# Patient Record
Sex: MALE | Race: BLACK OR AFRICAN AMERICAN | NOT HISPANIC OR LATINO | URBAN - METROPOLITAN AREA
[De-identification: names, ages, dates, MRNs, and addresses within clinical notes are randomized per-mention and may not be internally consistent; named-entity substitution may affect disease eponyms.]

---

## 2019-01-05 ENCOUNTER — INPATIENT (INPATIENT)
Facility: HOSPITAL | Age: 64
LOS: 1 days | Discharge: HOME | DRG: 66 | End: 2019-01-07
Attending: INTERNAL MEDICINE | Admitting: INTERNAL MEDICINE
Payer: COMMERCIAL

## 2019-01-05 VITALS
TEMPERATURE: 98 F | SYSTOLIC BLOOD PRESSURE: 150 MMHG | HEART RATE: 67 BPM | WEIGHT: 158.07 LBS | HEIGHT: 68 IN | OXYGEN SATURATION: 100 % | DIASTOLIC BLOOD PRESSURE: 90 MMHG | RESPIRATION RATE: 16 BRPM

## 2019-01-05 DIAGNOSIS — R27.0 ATAXIA, UNSPECIFIED: ICD-10-CM

## 2019-01-05 LAB
ALBUMIN SERPL ELPH-MCNC: 3.8 G/DL — SIGNIFICANT CHANGE UP (ref 3.5–5)
ALP SERPL-CCNC: 47 U/L — SIGNIFICANT CHANGE UP (ref 40–120)
ALT FLD-CCNC: 31 U/L DA — SIGNIFICANT CHANGE UP (ref 10–60)
ANION GAP SERPL CALC-SCNC: 6 MMOL/L — SIGNIFICANT CHANGE UP (ref 5–17)
APTT BLD: 30.8 SEC — SIGNIFICANT CHANGE UP (ref 27.5–36.3)
AST SERPL-CCNC: 21 U/L — SIGNIFICANT CHANGE UP (ref 10–40)
BASOPHILS # BLD AUTO: 0 K/UL — SIGNIFICANT CHANGE UP (ref 0–0.2)
BASOPHILS NFR BLD AUTO: 1.2 % — SIGNIFICANT CHANGE UP (ref 0–2)
BILIRUB SERPL-MCNC: 0.3 MG/DL — SIGNIFICANT CHANGE UP (ref 0.2–1.2)
BUN SERPL-MCNC: 15 MG/DL — SIGNIFICANT CHANGE UP (ref 7–18)
CALCIUM SERPL-MCNC: 8.8 MG/DL — SIGNIFICANT CHANGE UP (ref 8.4–10.5)
CHLORIDE SERPL-SCNC: 103 MMOL/L — SIGNIFICANT CHANGE UP (ref 96–108)
CK MB BLD-MCNC: <1.1 % — SIGNIFICANT CHANGE UP (ref 0–3.5)
CK MB CFR SERPL CALC: <1 NG/ML — SIGNIFICANT CHANGE UP (ref 0–3.6)
CK SERPL-CCNC: 87 U/L — SIGNIFICANT CHANGE UP (ref 35–232)
CO2 SERPL-SCNC: 30 MMOL/L — SIGNIFICANT CHANGE UP (ref 22–31)
CREAT SERPL-MCNC: 1.08 MG/DL — SIGNIFICANT CHANGE UP (ref 0.5–1.3)
EOSINOPHIL # BLD AUTO: 0 K/UL — SIGNIFICANT CHANGE UP (ref 0–0.5)
EOSINOPHIL NFR BLD AUTO: 0.7 % — SIGNIFICANT CHANGE UP (ref 0–6)
GLUCOSE SERPL-MCNC: 73 MG/DL — SIGNIFICANT CHANGE UP (ref 70–99)
HCT VFR BLD CALC: 44.4 % — SIGNIFICANT CHANGE UP (ref 39–50)
HGB BLD-MCNC: 14.2 G/DL — SIGNIFICANT CHANGE UP (ref 13–17)
INR BLD: 1.2 RATIO — HIGH (ref 0.88–1.16)
LYMPHOCYTES # BLD AUTO: 1.2 K/UL — SIGNIFICANT CHANGE UP (ref 1–3.3)
LYMPHOCYTES # BLD AUTO: 32.5 % — SIGNIFICANT CHANGE UP (ref 13–44)
MCHC RBC-ENTMCNC: 29.6 PG — SIGNIFICANT CHANGE UP (ref 27–34)
MCHC RBC-ENTMCNC: 32 GM/DL — SIGNIFICANT CHANGE UP (ref 32–36)
MCV RBC AUTO: 92.4 FL — SIGNIFICANT CHANGE UP (ref 80–100)
MONOCYTES # BLD AUTO: 0.4 K/UL — SIGNIFICANT CHANGE UP (ref 0–0.9)
MONOCYTES NFR BLD AUTO: 12.3 % — SIGNIFICANT CHANGE UP (ref 2–14)
NEUTROPHILS # BLD AUTO: 1.9 K/UL — SIGNIFICANT CHANGE UP (ref 1.8–7.4)
NEUTROPHILS NFR BLD AUTO: 53.2 % — SIGNIFICANT CHANGE UP (ref 43–77)
PLATELET # BLD AUTO: 210 K/UL — SIGNIFICANT CHANGE UP (ref 150–400)
POTASSIUM SERPL-MCNC: 3.7 MMOL/L — SIGNIFICANT CHANGE UP (ref 3.5–5.3)
POTASSIUM SERPL-SCNC: 3.7 MMOL/L — SIGNIFICANT CHANGE UP (ref 3.5–5.3)
PROT SERPL-MCNC: 8.6 G/DL — HIGH (ref 6–8.3)
PROTHROM AB SERPL-ACNC: 13.4 SEC — HIGH (ref 10–12.9)
RBC # BLD: 4.8 M/UL — SIGNIFICANT CHANGE UP (ref 4.2–5.8)
RBC # FLD: 11 % — SIGNIFICANT CHANGE UP (ref 10.3–14.5)
SODIUM SERPL-SCNC: 139 MMOL/L — SIGNIFICANT CHANGE UP (ref 135–145)
TROPONIN I SERPL-MCNC: <0.015 NG/ML — SIGNIFICANT CHANGE UP (ref 0–0.04)
WBC # BLD: 3.6 K/UL — LOW (ref 3.8–10.5)
WBC # FLD AUTO: 3.6 K/UL — LOW (ref 3.8–10.5)

## 2019-01-05 PROCEDURE — 99285 EMERGENCY DEPT VISIT HI MDM: CPT

## 2019-01-05 PROCEDURE — 70450 CT HEAD/BRAIN W/O DYE: CPT | Mod: 26

## 2019-01-05 RX ORDER — HYDROXYCHLOROQUINE SULFATE 200 MG
200 TABLET ORAL
Qty: 0 | Refills: 0 | Status: DISCONTINUED | OUTPATIENT
Start: 2019-01-05 | End: 2019-01-07

## 2019-01-05 RX ORDER — ATORVASTATIN CALCIUM 80 MG/1
40 TABLET, FILM COATED ORAL AT BEDTIME
Qty: 0 | Refills: 0 | Status: DISCONTINUED | OUTPATIENT
Start: 2019-01-05 | End: 2019-01-07

## 2019-01-05 RX ORDER — ASPIRIN/CALCIUM CARB/MAGNESIUM 324 MG
81 TABLET ORAL DAILY
Qty: 0 | Refills: 0 | Status: DISCONTINUED | OUTPATIENT
Start: 2019-01-05 | End: 2019-01-07

## 2019-01-05 NOTE — H&P ADULT - NEUROLOGICAL DETAILS
superficial reflexes intact/normal strength/alert and oriented x 3/sensation intact/cranial nerves intact

## 2019-01-05 NOTE — ED PROVIDER NOTE - OBJECTIVE STATEMENT
h/o Raynauds on Norvasc and Hydrochloroquine p/w slurred speech, dizziness, generalized weakness x 1 week. Pt feels difficulty articulating speech. Feels as if he will fall to one side when he walks. Wife reports pt weak, in bed for most of the day. No f/c/cough or body aches. No recent med changes.

## 2019-01-05 NOTE — H&P ADULT - PROBLEM SELECTOR PLAN 1
- p/w dizziness, slurring of speech and loss of balance  - CT head normal, CE normal  - no TPA as more than 4 hrs of symptoms  - will start on aspirin and statin  - on tele  - FU ECHO, carotid doppler  - Neuro consult  - Likely needs MRI after neuro consult - p/w dizziness, slurring of speech and loss of balance  - CT head normal, CE normal  - EKG NSR@64 bpm  - no TPA as more than 4 hrs of symptoms  - will start on aspirin and statin  - on tele  - FU ECHO, carotid doppler  - Neuro consult  - Likely needs MRI after neuro consult - p/w dizziness, slurring of speech and loss of balance, NIHSS O  - CT head normal, CE normal  - EKG NSR@64 bpm  - no TPA as more than 4 hrs of symptoms  - will start on aspirin and statin  - on tele  - FU ECHO, carotid doppler  - Neuro consult  - Likely needs MRI after neuro consult - p/w dizziness, slurring of speech and loss of balance, NIHSS O  - CT head normal, CE normal  - EKG NSR@64 bpm  - no TPA as more than 4 hrs of symptoms  - will start on aspirin and statin  - on tele  - FU ECHO, carotid doppler, MRI  - Neuro Dr. Sheth consult  - Cardio Dr. Hernandez  -

## 2019-01-05 NOTE — H&P ADULT - FAMILY HISTORY
Mother  Still living? Unknown  Family history of hypertension, Age at diagnosis: Age Unknown  Family history of diabetes mellitus, Age at diagnosis: Age Unknown  Family history of pancreatic disease, Age at diagnosis: Age Unknown     Father  Still living? Unknown  Family history of throat cancer, Age at diagnosis: Age Unknown

## 2019-01-05 NOTE — H&P ADULT - PROBLEM SELECTOR PLAN 3
IMPROVE VTE Individual Risk Assessment    RISK                                                                Points    [  ] Previous VTE                                                  3  [  ] Thrombophilia                                               2  [  ] Lower limb paralysis                                      2        (unable to hold up >15 seconds)    [  ] Current Cancer                                              2         (within 6 months)  [  ] Immobilization > 24 hrs                                1  [  ] ICU/CCU stay > 24 hours                              1  [ x ] Age > 60                                                      1    IMPROVE VTE Score 1  No indication for DVT PPx  No indication for GI PPx IMPROVE VTE Individual Risk Assessment    RISK                                                                Points    [  ] Previous VTE                                                  3  [  ] Thrombophilia                                               2  [  ] Lower limb paralysis                                      2        (unable to hold up >15 seconds)    [  ] Current Cancer                                              2         (within 6 months)  [  ] Immobilization > 24 hrs                                1  [  ] ICU/CCU stay > 24 hours                              1  [ x ] Age > 60                                                      1    IMPROVE VTE Score 1  will start on heparin as he will be mostly bed bound  No indication for GI PPx

## 2019-01-05 NOTE — H&P ADULT - ASSESSMENT
63M form home, with PMHx of raynaud's  presented to ED c/o dizziness and slurring of speech. He states he was in his usual state of health until monday, when he started feeling dizzy. He explained his dizziness as lightheadedness and denies room spinning sensation. Since thursday, he started noticing slurring of speech and difficulty articulating words, loss of balance and difficulty with coordination. He denies any weakness/N/V/tingling/ burning sensation/ visual symptoms.       ED course: Vitals stable  Radiological findings- CT head normal

## 2019-01-05 NOTE — H&P ADULT - HISTORY OF PRESENT ILLNESS
63M form home, with PMHx of raynaud's  presented to ED c/o dizziness and slurring of speech. He states he was in his usual state of health until monday, when he started feeling dizzy. He explained his dizziness as lightheadedness and denies room spinning sensation. Since thursday, he started noticing slurring of speech and difficulty articulating words, loss of balance and difficulty with coordination. He denies any weakness/N/V/tingling/ burning sensation/ visual symptoms.     On my encounter, he was speaking normally with no slurring however pt. said he is taking time to speak.  ED course: Vitals stable  Radiological findings- CT head normal 63M form home, with PMHx of raynaud's  presented to ED c/o dizziness and slurring of speech. He states he was in his usual state of health until monday, when he started feeling dizzy. He explained his dizziness as lightheadedness and denies room spinning sensation. Since thursday, he started noticing slurring of speech and difficulty articulating words, loss of balance and difficulty with coordination. He denies any weakness/N/V/tingling/ burning sensation/ visual symptoms.     On my encounter, he was speaking normally with no slurring however pt. said he is taking time to speak and not in his baseline. NIHSS 0  ED course: Vitals stable  Radiological findings- CT head normal

## 2019-01-06 DIAGNOSIS — I73.00 RAYNAUD'S SYNDROME WITHOUT GANGRENE: ICD-10-CM

## 2019-01-06 DIAGNOSIS — Z29.9 ENCOUNTER FOR PROPHYLACTIC MEASURES, UNSPECIFIED: ICD-10-CM

## 2019-01-06 DIAGNOSIS — I63.9 CEREBRAL INFARCTION, UNSPECIFIED: ICD-10-CM

## 2019-01-06 LAB
ANION GAP SERPL CALC-SCNC: 5 MMOL/L — SIGNIFICANT CHANGE UP (ref 5–17)
BASOPHILS # BLD AUTO: 0.1 K/UL — SIGNIFICANT CHANGE UP (ref 0–0.2)
BASOPHILS NFR BLD AUTO: 1.4 % — SIGNIFICANT CHANGE UP (ref 0–2)
BUN SERPL-MCNC: 12 MG/DL — SIGNIFICANT CHANGE UP (ref 7–18)
CALCIUM SERPL-MCNC: 8.6 MG/DL — SIGNIFICANT CHANGE UP (ref 8.4–10.5)
CHLORIDE SERPL-SCNC: 105 MMOL/L — SIGNIFICANT CHANGE UP (ref 96–108)
CHOLEST SERPL-MCNC: 209 MG/DL — HIGH (ref 10–199)
CK MB BLD-MCNC: <1.3 % — SIGNIFICANT CHANGE UP (ref 0–3.5)
CK MB BLD-MCNC: <1.4 % — SIGNIFICANT CHANGE UP (ref 0–3.5)
CK MB CFR SERPL CALC: <1 NG/ML — SIGNIFICANT CHANGE UP (ref 0–3.6)
CK MB CFR SERPL CALC: <1 NG/ML — SIGNIFICANT CHANGE UP (ref 0–3.6)
CK SERPL-CCNC: 73 U/L — SIGNIFICANT CHANGE UP (ref 35–232)
CK SERPL-CCNC: 76 U/L — SIGNIFICANT CHANGE UP (ref 35–232)
CO2 SERPL-SCNC: 28 MMOL/L — SIGNIFICANT CHANGE UP (ref 22–31)
CREAT SERPL-MCNC: 1.09 MG/DL — SIGNIFICANT CHANGE UP (ref 0.5–1.3)
EOSINOPHIL # BLD AUTO: 0 K/UL — SIGNIFICANT CHANGE UP (ref 0–0.5)
EOSINOPHIL NFR BLD AUTO: 1 % — SIGNIFICANT CHANGE UP (ref 0–6)
FOLATE SERPL-MCNC: 11.9 NG/ML — SIGNIFICANT CHANGE UP
GLUCOSE SERPL-MCNC: 82 MG/DL — SIGNIFICANT CHANGE UP (ref 70–99)
HBA1C BLD-MCNC: 5.6 % — SIGNIFICANT CHANGE UP (ref 4–5.6)
HCT VFR BLD CALC: 41.4 % — SIGNIFICANT CHANGE UP (ref 39–50)
HDLC SERPL-MCNC: 34 MG/DL — LOW
HGB BLD-MCNC: 13.4 G/DL — SIGNIFICANT CHANGE UP (ref 13–17)
LIPID PNL WITH DIRECT LDL SERPL: 137 MG/DL — SIGNIFICANT CHANGE UP
LYMPHOCYTES # BLD AUTO: 1.2 K/UL — SIGNIFICANT CHANGE UP (ref 1–3.3)
LYMPHOCYTES # BLD AUTO: 30.7 % — SIGNIFICANT CHANGE UP (ref 13–44)
MAGNESIUM SERPL-MCNC: 2.2 MG/DL — SIGNIFICANT CHANGE UP (ref 1.6–2.6)
MCHC RBC-ENTMCNC: 30.5 PG — SIGNIFICANT CHANGE UP (ref 27–34)
MCHC RBC-ENTMCNC: 32.2 GM/DL — SIGNIFICANT CHANGE UP (ref 32–36)
MCV RBC AUTO: 94.5 FL — SIGNIFICANT CHANGE UP (ref 80–100)
MONOCYTES # BLD AUTO: 0.4 K/UL — SIGNIFICANT CHANGE UP (ref 0–0.9)
MONOCYTES NFR BLD AUTO: 11.4 % — SIGNIFICANT CHANGE UP (ref 2–14)
NEUTROPHILS # BLD AUTO: 2.2 K/UL — SIGNIFICANT CHANGE UP (ref 1.8–7.4)
NEUTROPHILS NFR BLD AUTO: 55.5 % — SIGNIFICANT CHANGE UP (ref 43–77)
PHOSPHATE SERPL-MCNC: 3.4 MG/DL — SIGNIFICANT CHANGE UP (ref 2.5–4.5)
PLATELET # BLD AUTO: 202 K/UL — SIGNIFICANT CHANGE UP (ref 150–400)
POTASSIUM SERPL-MCNC: 3.5 MMOL/L — SIGNIFICANT CHANGE UP (ref 3.5–5.3)
POTASSIUM SERPL-SCNC: 3.5 MMOL/L — SIGNIFICANT CHANGE UP (ref 3.5–5.3)
RBC # BLD: 4.38 M/UL — SIGNIFICANT CHANGE UP (ref 4.2–5.8)
RBC # FLD: 11.1 % — SIGNIFICANT CHANGE UP (ref 10.3–14.5)
SODIUM SERPL-SCNC: 138 MMOL/L — SIGNIFICANT CHANGE UP (ref 135–145)
TOTAL CHOLESTEROL/HDL RATIO MEASUREMENT: 6.1 RATIO — SIGNIFICANT CHANGE UP (ref 3.4–9.6)
TRIGL SERPL-MCNC: 188 MG/DL — HIGH (ref 10–149)
TROPONIN I SERPL-MCNC: <0.015 NG/ML — SIGNIFICANT CHANGE UP (ref 0–0.04)
TROPONIN I SERPL-MCNC: <0.015 NG/ML — SIGNIFICANT CHANGE UP (ref 0–0.04)
TSH SERPL-MCNC: 2.44 UU/ML — SIGNIFICANT CHANGE UP (ref 0.34–4.82)
VIT B12 SERPL-MCNC: 267 PG/ML — SIGNIFICANT CHANGE UP (ref 232–1245)
WBC # BLD: 3.9 K/UL — SIGNIFICANT CHANGE UP (ref 3.8–10.5)
WBC # FLD AUTO: 3.9 K/UL — SIGNIFICANT CHANGE UP (ref 3.8–10.5)

## 2019-01-06 PROCEDURE — 99222 1ST HOSP IP/OBS MODERATE 55: CPT

## 2019-01-06 RX ORDER — HEPARIN SODIUM 5000 [USP'U]/ML
5000 INJECTION INTRAVENOUS; SUBCUTANEOUS EVERY 8 HOURS
Qty: 0 | Refills: 0 | Status: DISCONTINUED | OUTPATIENT
Start: 2019-01-06 | End: 2019-01-07

## 2019-01-06 RX ORDER — NIFEDIPINE 30 MG
60 TABLET, EXTENDED RELEASE 24 HR ORAL DAILY
Qty: 0 | Refills: 0 | Status: DISCONTINUED | OUTPATIENT
Start: 2019-01-06 | End: 2019-01-07

## 2019-01-06 RX ORDER — NIFEDIPINE 30 MG
1 TABLET, EXTENDED RELEASE 24 HR ORAL
Qty: 0 | Refills: 0 | COMMUNITY

## 2019-01-06 RX ADMIN — HEPARIN SODIUM 5000 UNIT(S): 5000 INJECTION INTRAVENOUS; SUBCUTANEOUS at 22:52

## 2019-01-06 RX ADMIN — Medication 2.5 MILLIGRAM(S): at 05:56

## 2019-01-06 RX ADMIN — ATORVASTATIN CALCIUM 40 MILLIGRAM(S): 80 TABLET, FILM COATED ORAL at 22:52

## 2019-01-06 RX ADMIN — Medication 81 MILLIGRAM(S): at 12:08

## 2019-01-06 RX ADMIN — Medication 200 MILLIGRAM(S): at 18:34

## 2019-01-06 RX ADMIN — Medication 60 MILLIGRAM(S): at 05:27

## 2019-01-06 RX ADMIN — HEPARIN SODIUM 5000 UNIT(S): 5000 INJECTION INTRAVENOUS; SUBCUTANEOUS at 14:22

## 2019-01-06 RX ADMIN — Medication 200 MILLIGRAM(S): at 05:56

## 2019-01-06 NOTE — CONSULT NOTE ADULT - SUBJECTIVE AND OBJECTIVE BOX
EP ATTENDING      HISTORY OF PRESENT ILLNESS: He is a pleasant 64 y/o male [facility] Raynauds who is admitted with dizziness and slurred speech. His initial NCHCT was unremarkable, and his EKG is currently sinus. No echo on file, neurology and brain MRI pending. He denies palpitations, syncope, nor angina.    PAST MEDICAL & SURGICAL HISTORY:  Raynaud disease  No significant past surgical history        MEDICATIONS  (STANDING):  aspirin  chewable 81 milliGRAM(s) Oral daily  atorvastatin 40 milliGRAM(s) Oral at bedtime  heparin  Injectable 5000 Unit(s) SubCutaneous every 8 hours  hydroxychloroquine 200 milliGRAM(s) Oral two times a day  NIFEdipine XL 60 milliGRAM(s) Oral daily  predniSONE   Tablet 2.5 milliGRAM(s) Oral daily      Allergies    No Known Allergies    Intolerances        FAMILY HISTORY:  Family history of pancreatic disease (Mother)  Family history of throat cancer (Father)  Family history of diabetes mellitus (Mother)  Family history of hypertension (Mother)    Non-contributary for premature coronary disease or sudden cardiac death    SOCIAL HISTORY:    [ x] Non-smoker  [ ] Smoker  [ ] Alcohol      REVIEW OF SYSTEMS:  [ ]chest pain  [  ]shortness of breath  [  ]palpitations  [  ]syncope  [ ]near syncope [ ]upper extremity weakness   [ ] lower extremity weakness  [  ]diplopia  [  ]altered mental status   [  ]fevers  [ ]chills [ ]nausea  [ ]vomitting  [  ]dysphagia    [ ]abdominal pain  [ ]melena  [ ]BRBPR    [  ]epistaxis  [  ]rash    [ ]lower extremity edema        [x ] All others negative	  [ ] Unable to obtain    PHYSICAL EXAM:  T(C): 36.8 (01-06-19 @ 10:37), Max: 37 (01-06-19 @ 07:28)  HR: 74 (01-06-19 @ 10:37) (58 - 74)  BP: 143/79 (01-06-19 @ 10:37) (114/59 - 150/90)  RR: 16 (01-06-19 @ 10:37) (12 - 17)  SpO2: 97% (01-06-19 @ 10:37) (97% - 100%)  Wt(kg): --    Appearance: Normal	  HEENT:   Normal oral mucosa, PERRL, EOMI	  Lymphatic: No lymphadenopathy , no edema  Cardiovascular: Normal S1 S2, No JVD, No murmurs , Peripheral pulses palpable 2+ bilaterally  Respiratory: Lungs clear to auscultation, normal effort 	  Gastrointestinal:  Soft, Non-tender, + BS	  Skin: No rashes, No ecchymoses, No cyanosis, warm to touch  Musculoskeletal: Normal range of motion, normal strength  Psychiatry:  Mood & affect appropriate      TELEMETRY: 	    ECG:  	    Echo:  NST:  Cath:  	  	  LABS:	 	                          13.4   3.9   )-----------( 202      ( 06 Jan 2019 06:20 )             41.4     01-06    138  |  105  |  12  ----------------------------<  82  3.5   |  28  |  1.09    Ca    8.6      06 Jan 2019 06:20  Phos  3.4     01-06  Mg     2.2     01-06    TPro  8.6<H>  /  Alb  3.8  /  TBili  0.3  /  DBili  x   /  AST  21  /  ALT  31  /  AlkPhos  47  01-05    proBNP:   Lipid Profile:   HgA1c: Hemoglobin A1C, Whole Blood: 5.6 % (01-06 @ 10:50)    TSH: Thyroid Stimulating Hormone, Serum: 2.44 uU/mL (01-06 @ 06:20)      A/P) He is a pleasant 64 y/[facility] male [facility] with dizziness and slurred speech. His initial NCHCT was unremarkable, and his EKG is currently sinus. No echo on file, neurology and brain MRI pending. He denies palpitations, syncope, nor angina.    -get brain MRI to evaluate for stroke  -consider neurology consult  -get echo and admit to telemetry  -if his workup is consistent with a cryptogenic stroke then I would recommend an outpatient ILR specifically to rule out atrial fibrillation  -Dr Hernandez to assume cardiac care tomorrow      Cyndi Rosen M.D., RS  Cardiac Electrophysiology  [facility]  [address] [place] [address], [address] [place] [address]  [email or web address]    office [phone]  pager [phone]

## 2019-01-06 NOTE — PROGRESS NOTE ADULT - SUBJECTIVE AND OBJECTIVE BOX
Patient is a 63y old  Male who presents with a chief complaint of dizziness and slurring of speech (05 Jan 2019 21:36)    PATIENT IS SEEN AND EXAMINED IN MEDICAL FLOOR.    ALLERGIES:  No Known Allergies    Daily Height in cm: 172.72 (05 Jan 2019 14:17)      VITALS:    Vital Signs Last 24 Hrs  T(C): 37 (06 Jan 2019 07:28), Max: 37 (06 Jan 2019 07:28)  T(F): 98.6 (06 Jan 2019 07:28), Max: 98.6 (06 Jan 2019 07:28)  HR: 58 (06 Jan 2019 07:28) (58 - 68)  BP: 132/81 (06 Jan 2019 07:28) (114/59 - 150/90)  BP(mean): --  RR: 16 (06 Jan 2019 07:28) (12 - 17)  SpO2: 97% (06 Jan 2019 07:28) (97% - 100%)    LABS:    CBC Full  -  ( 06 Jan 2019 06:20 )  WBC Count : 3.9 K/uL  Hemoglobin : 13.4 g/dL  Hematocrit : 41.4 %  Platelet Count - Automated : 202 K/uL  Mean Cell Volume : 94.5 fl  Mean Cell Hemoglobin : 30.5 pg  Mean Cell Hemoglobin Concentration : 32.2 gm/dL  Auto Neutrophil # : 2.2 K/uL  Auto Lymphocyte # : 1.2 K/uL  Auto Monocyte # : 0.4 K/uL  Auto Eosinophil # : 0.0 K/uL  Auto Basophil # : 0.1 K/uL  Auto Neutrophil % : 55.5 %  Auto Lymphocyte % : 30.7 %  Auto Monocyte % : 11.4 %  Auto Eosinophil % : 1.0 %  Auto Basophil % : 1.4 %    PT/INR - ( 05 Jan 2019 16:23 )   PT: 13.4 sec;   INR: 1.20 ratio         PTT - ( 05 Jan 2019 16:23 )  PTT:30.8 sec  01-06    138  |  105  |  12  ----------------------------<  82  3.5   |  28  |  1.09    Ca    8.6      06 Jan 2019 06:20  Phos  3.4     01-06  Mg     2.2     01-06    TPro  8.6<H>  /  Alb  3.8  /  TBili  0.3  /  DBili  x   /  AST  21  /  ALT  31  /  AlkPhos  47  01-05      CARDIAC MARKERS ( 06 Jan 2019 06:20 )  <0.015 ng/mL / x     / 73 U/L / x     / <1.0 ng/mL  CARDIAC MARKERS ( 06 Jan 2019 04:44 )  <0.015 ng/mL / x     / 76 U/L / x     / <1.0 ng/mL  CARDIAC MARKERS ( 05 Jan 2019 16:23 )  <0.015 ng/mL / x     / 87 U/L / x     / <1.0 ng/mL      LIVER FUNCTIONS - ( 05 Jan 2019 16:23 )  Alb: 3.8 g/dL / Pro: 8.6 g/dL / ALK PHOS: 47 U/L / ALT: 31 U/L DA / AST: 21 U/L / GGT: x           Creatinine Trend: 1.09<--, 1.08<--  I&O's Summary      MEDICATIONS:    MEDICATIONS  (STANDING):  aspirin  chewable 81 milliGRAM(s) Oral daily  atorvastatin 40 milliGRAM(s) Oral at bedtime  heparin  Injectable 5000 Unit(s) SubCutaneous every 8 hours  hydroxychloroquine 200 milliGRAM(s) Oral two times a day  NIFEdipine XL 60 milliGRAM(s) Oral daily  predniSONE   Tablet 2.5 milliGRAM(s) Oral daily      MEDICATIONS  (PRN):      REVIEW OF SYSTEMS:                           ALL ROS DONE [ X   ]    CONSTITUTIONAL:  LETHARGIC [   ], FEVER [   ], UNRESPONSIVE [   ]  CVS:  CP  [   ], SOB, [   ], PALPITATIONS [   ], DIZZYNESS [   ]  RS: COUGH [   ], SPUTUM [   ]  GI: ABDOMINAL PAIN [   ], NAUSEA [   ], VOMITINGS [   ], DIARRHEA [   ], CONSTIPATION [   ]  :  DYSURIA [   ], NOCTURIA [   ], INCREASED FREQUENCY [   ], DRIBLING [   ],  SKELETAL: PAINFUL JOINTS [   ], SWOLLEN JOINTS [   ], NECK ACHE [   ], LOW BACK ACHE [   ],  SKIN : ULCERS [   ], RASH [   ], ITCHING [   ]  CNS: HEAD ACHE [   ], DOUBLE VISION [   ], BLURRED VISION [   ], AMS / CONFUSION [   ], SEIZURES [   ], WEAKNESS [   ],TINGLING / NUMBNESS [   ]    PHYSICAL EXAMINATION:  GENERAL APPEARANCE: NO DISTRESS  HEENT:  NO PALLOR, NO  JVD,  NO   NODES, NECK SUPPLE  CVS: S1 +, S2 +,   RS: AEEB,  OCCASIONAL  RALES +,   NO RONCHI  ABD: SOFT, NT, NO, BS +  EXT: NO PE  SKIN: WARM,   SKELETAL:  ROM ACCEPTABLE  CNS:  AAO X    ,   DEFICITS    RADIOLOGY :      ASSESSMENT :     Ataxia  Raynaud disease        PLAN:  HPI:  63M form home, with PMHx of raynaud's  presented to ED c/o dizziness and slurring of speech. He states he was in his usual state of health until monday, when he started feeling dizzy. He explained his dizziness as lightheadedness and denies room spinning sensation. Since thursday, he started noticing slurring of speech and difficulty articulating words, loss of balance and difficulty with coordination. He denies any weakness/N/V/tingling/ burning sensation/ visual symptoms.     On my encounter, he was speaking normally with no slurring however pt. said he is taking time to speak and not in his baseline. NIHSS 0  ED course: Vitals stable  Radiological findings- CT head normal (05 Jan 2019 21:36)    - Patient is a 63y old  Male who presents with a chief complaint of dizziness and slurring of speech (05 Jan 2019 21:36)    PATIENT IS SEEN AND EXAMINED IN MEDICAL FLOOR.    ALLERGIES:  No Known Allergies    Daily Height in cm: 172.72 (05 Jan 2019 14:17)      VITALS:    Vital Signs Last 24 Hrs  T(C): 37 (06 Jan 2019 07:28), Max: 37 (06 Jan 2019 07:28)  T(F): 98.6 (06 Jan 2019 07:28), Max: 98.6 (06 Jan 2019 07:28)  HR: 58 (06 Jan 2019 07:28) (58 - 68)  BP: 132/81 (06 Jan 2019 07:28) (114/59 - 150/90)  BP(mean): --  RR: 16 (06 Jan 2019 07:28) (12 - 17)  SpO2: 97% (06 Jan 2019 07:28) (97% - 100%)    LABS:    CBC Full  -  ( 06 Jan 2019 06:20 )  WBC Count : 3.9 K/uL  Hemoglobin : 13.4 g/dL  Hematocrit : 41.4 %  Platelet Count - Automated : 202 K/uL  Mean Cell Volume : 94.5 fl  Mean Cell Hemoglobin : 30.5 pg  Mean Cell Hemoglobin Concentration : 32.2 gm/dL  Auto Neutrophil # : 2.2 K/uL  Auto Lymphocyte # : 1.2 K/uL  Auto Monocyte # : 0.4 K/uL  Auto Eosinophil # : 0.0 K/uL  Auto Basophil # : 0.1 K/uL  Auto Neutrophil % : 55.5 %  Auto Lymphocyte % : 30.7 %  Auto Monocyte % : 11.4 %  Auto Eosinophil % : 1.0 %  Auto Basophil % : 1.4 %    PT/INR - ( 05 Jan 2019 16:23 )   PT: 13.4 sec;   INR: 1.20 ratio         PTT - ( 05 Jan 2019 16:23 )  PTT:30.8 sec  01-06    138  |  105  |  12  ----------------------------<  82  3.5   |  28  |  1.09    Ca    8.6      06 Jan 2019 06:20  Phos  3.4     01-06  Mg     2.2     01-06    TPro  8.6<H>  /  Alb  3.8  /  TBili  0.3  /  DBili  x   /  AST  21  /  ALT  31  /  AlkPhos  47  01-05      CARDIAC MARKERS ( 06 Jan 2019 06:20 )  <0.015 ng/mL / x     / 73 U/L / x     / <1.0 ng/mL  CARDIAC MARKERS ( 06 Jan 2019 04:44 )  <0.015 ng/mL / x     / 76 U/L / x     / <1.0 ng/mL  CARDIAC MARKERS ( 05 Jan 2019 16:23 )  <0.015 ng/mL / x     / 87 U/L / x     / <1.0 ng/mL      LIVER FUNCTIONS - ( 05 Jan 2019 16:23 )  Alb: 3.8 g/dL / Pro: 8.6 g/dL / ALK PHOS: 47 U/L / ALT: 31 U/L DA / AST: 21 U/L / GGT: x           Creatinine Trend: 1.09<--, 1.08<--  I&O's Summary      MEDICATIONS:    MEDICATIONS  (STANDING):  aspirin  chewable 81 milliGRAM(s) Oral daily  atorvastatin 40 milliGRAM(s) Oral at bedtime  heparin  Injectable 5000 Unit(s) SubCutaneous every 8 hours  hydroxychloroquine 200 milliGRAM(s) Oral two times a day  NIFEdipine XL 60 milliGRAM(s) Oral daily  predniSONE   Tablet 2.5 milliGRAM(s) Oral daily      MEDICATIONS  (PRN):      REVIEW OF SYSTEMS:                           ALL ROS DONE [ X   ]    CONSTITUTIONAL:  LETHARGIC [   ], FEVER [   ], UNRESPONSIVE [   ]  CVS:  CP  [   ], SOB, [   ], PALPITATIONS [   ], DIZZYNESS [   ]  RS: COUGH [   ], SPUTUM [   ]  GI: ABDOMINAL PAIN [   ], NAUSEA [   ], VOMITINGS [   ], DIARRHEA [   ], CONSTIPATION [   ]  :  DYSURIA [   ], NOCTURIA [   ], INCREASED FREQUENCY [   ], DRIBLING [   ],  SKELETAL: PAINFUL JOINTS [   ], SWOLLEN JOINTS [   ], NECK ACHE [   ], LOW BACK ACHE [   ],  SKIN : ULCERS [   ], RASH [   ], ITCHING [   ]  CNS: HEAD ACHE [   ], DOUBLE VISION [   ], BLURRED VISION [   ], AMS / CONFUSION [   ], SEIZURES [   ], WEAKNESS [   ],TINGLING / NUMBNESS [   ]    PHYSICAL EXAMINATION:  GENERAL APPEARANCE: NO DISTRESS  HEENT:  NO PALLOR, NO  JVD,  NO   NODES, NECK SUPPLE  CVS: S1 +, S2 +,   RS: AEEB,  OCCASIONAL  RALES +,   NO RONCHI  ABD: SOFT, NT, NO, BS +  EXT: NO PE  SKIN: WARM,   SKELETAL:  ROM ACCEPTABLE  CNS:  AAO X 3   ,  NO DEFICITS    RADIOLOGY :    < from: CT Head No Cont (01.05.19 @ 16:51) >    IMPRESSION:  No acute intracranial hemorrhage or acute territorial infarct. If acute   ischemia is a strong clinical concern, MRI exam is recommended for   further evaluation if there is no contraindication.    < end of copied text >      ASSESSMENT :     Ataxia  Raynaud disease        PLAN:  HPI:  63M form home, with PMHx of raynaud's  presented to ED c/o dizziness and slurring of speech. He states he was in his usual state of health until monday, when he started feeling dizzy. He explained his dizziness as lightheadedness and denies room spinning sensation. Since thursday, he started noticing slurring of speech and difficulty articulating words, loss of balance and difficulty with coordination. He denies any weakness/N/V/tingling/ burning sensation/ visual symptoms.     On my encounter, he was speaking normally with no slurring however pt. said he is taking time to speak and not in his baseline. NIHSS 0  ED course: Vitals stable  Radiological findings- CT head normal (05 Jan 2019 21:36)    - SUSPECT CVA / VASCULITIS - OBTAIN MRI OF BRAIN AND NEUROLOGY CONSULT  - F/UP ECHOCARDIOGRAM  - DC PLAN IN AM HOME , IF STABLE  - GI AND DVT PROPHYLAXIS  - DR. MANTILLA

## 2019-01-06 NOTE — CONSULT NOTE ADULT - ASSESSMENT
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  A/P:    - No IV tpa given because he arrived in the ED >24 hours after onset of symptoms  - ASA/ PLavix  - Statin  - Dysphagia screen  - DVT prophylaxia  - MRI/A brain-carotids  - PT eval  - Speech/swallow eval  2) Esotropia of recent onset without cause and recent fatigue, if not explained by location of an old lacunar stroke, consider MG; plan check antibody to Acetylcholine Receptor and arrange for follow up in the outpatient office after discharge.  Total Critical Care Time spent:

## 2019-01-06 NOTE — CONSULT NOTE ADULT - SUBJECTIVE AND OBJECTIVE BOX
Patient is a 63y old  Male who presents with a chief complaint of dizziness and slurring of speech (06 Jan 2019 13:40)      HPI:  Well until Wednesday. After a short nap he noted he had difficulty speaking. He had difficulty enunciating words that werecoming out of his mouth slurred. His wife also noted a few words that did not sound like a word in English.  His wife also noted staggering when walking. He is a  and he noted fatigue and difficulty placing the mail into the correct boxes.    PAST MEDICAL & SURGICAL HISTORY:  Raynaud disease  No significant past surgical history      FAMILY HISTORY:  Family history of pancreatic disease (Mother)  Family history of throat cancer (Father)  Family history of diabetes mellitus (Mother)  Family history of hypertension (Mother)        Social Hx:  Nonsmoker, no drug or alcohol use    MEDICATIONS  (STANDING):  aspirin  chewable 81 milliGRAM(s) Oral daily  atorvastatin 40 milliGRAM(s) Oral at bedtime  heparin  Injectable 5000 Unit(s) SubCutaneous every 8 hours  hydroxychloroquine 200 milliGRAM(s) Oral two times a day  NIFEdipine XL 60 milliGRAM(s) Oral daily  predniSONE   Tablet 2.5 milliGRAM(s) Oral daily       Allergies    No Known Allergies    Intolerances        ROS: Pertinent positives in HPI, all other ROS were reviewed and are negative.      Vital Signs Last 24 Hrs  T(C): 36.8 (06 Jan 2019 10:37), Max: 37 (06 Jan 2019 07:28)  T(F): 98.3 (06 Jan 2019 10:37), Max: 98.6 (06 Jan 2019 07:28)  HR: 74 (06 Jan 2019 10:37) (58 - 74)  BP: 143/79 (06 Jan 2019 10:37) (114/59 - 143/79)  BP(mean): --  RR: 16 (06 Jan 2019 10:37) (12 - 16)  SpO2: 97% (06 Jan 2019 10:37) (97% - 100%)        Constitutional: awake and alert.  HEENT: PERRLA, EOMI,   Neck: Supple.  Respiratory: Breath sounds are clear bilaterally  Cardiovascular: S1 and S2, regular / irregular rhythm  Gastrointestinal: soft, nontender  Extremities:  no edema  Vascular: Caritid Bruit - no  Musculoskeletal: no joint swelling/tenderness, no abnormal movements  Skin: No rashes    Neurological exam:  HF: A x O x 3. Appropriately interactive, normal affect. Speech dysarthri, No Aphasia or paraphasic errors. Naming /repetition intact but he named the collar "neck" and called the sleeve of the coat the"wrist"  CN: DON, EOM: Esotropia that he can correct when focussing with one eye at a time OD>OD. VFF, facial sensation normal, left NLFD, tongue midline, Palate moves equally, SCM equal bilaterally  Motor: No pronator drift, Strength 5/5 in all 4 ext, normal bulk and tone, no tremor, rigidity or bradykinesia.    Sens: Intact to light touch / PP/ VS/ JS   Extinction on the left  Reflexes: Symmetric and normal . BJ 2+, BR 2+, KJ 2+, AJ 2+, downgoing toes b/l  Coord:  Left FNFA, dysmetria, NIEVES intact   Gait/Balance: Normal; unable to walk tandem; Romberg +    NIHSS: 6  1A: Level of consciousness       0= Alert; keenly responsive  1B: Ask month and age       0= Both questions right  1C: "Blink eyes" and "Squeeze Hands"       0= Performs both  2: Horizontal EOMs       +1= Partial gaze palsy: can be overcome  3: Visual fields       0= No visual loss  4: Facial palsy (use grimace if obtunded)       0= Normal symmetry       +1= Minor paralysis (flat NLF, smile asymmetry)   5A: Left arm motor drift (count out loud and use fingers to show count)       0= No drift x 10 seconds       +1= Drift but doesn't hit bed  5B: Right arm motor drift       0= No drift x 10 seconds  6A: Left leg motor drift       0= No drift x 10 seconds  6B: Right leg motor drift       0= No drift x 10 seconds  7: Limb ataxia (FNF/heel-shin)      +1= Ataxia in 1 limb  8: Sensation       0= Normal, no sensory loss  9: Language/aphasia- describe the scene (on jermaine); name the items; read the sentences (on jermaine)       0= Normal, no aphasia  10: Dysarthria- read the words       +1= mild-moderate dysarthria: slurring but can be understood       11: Extinction/inattention       0= No abnormality       +1= visual/tactile/auditory/spatial/personal inattention         MRS 3    Labs:                        13.4   3.9   )-----------( 202      ( 06 Jan 2019 06:20 )             41.4     01-06    138  |  105  |  12  ----------------------------<  82  3.5   |  28  |  1.09    Ca    8.6      06 Jan 2019 06:20  Phos  3.4     01-06  Mg     2.2     01-06    TPro  8.6<H>  /  Alb  3.8  /  TBili  0.3  /  DBili  x   /  AST  21  /  ALT  31  /  AlkPhos  47  01-05 01-06 [id number] 5.6    01-06 Chol 209<H>  HDL 34<L> Trig 188<H>  PT/INR - ( 05 Jan 2019 16:23 )   PT: 13.4 sec;   INR: 1.20 ratio         PTT - ( 05 Jan 2019 16:23 )  PTT:30.8 sec    Radiology report:  - CT head:    < from: CT Head No Cont (01.05.19 @ 16:51) >  EXAM:  CT BRAIN                            PROCEDURE DATE:  01/05/2019          INTERPRETATION:  CLINICAL STATEMENT: Dysarthria    TECHNIQUE: CT of the head was performed without IV contrast.    COMPARISON: None.    FINDINGS:  There is no acute intracranial hemorrhage, parenchymal mass, mass effect   or midline shift. There is no acute territorial infarct. There is no   hydrocephalus. Nonspecific bowing of the septum pellucidum to the left.   Chronic deformity right globe    The cranium is intact.     The visualized paranasal sinuses are   well-aerated.    IMPRESSION:  No acute intracranial hemorrhage or acute territorial infarct. If acute   ischemia is a strong clinical concern, MRI exam is recommended for   further evaluation if there is no contraindication.      DIANA STEWART M.D., ATTENDING RADIOLOGIST    < end of copied text >

## 2019-01-07 ENCOUNTER — TRANSCRIPTION ENCOUNTER (OUTPATIENT)
Age: 64
End: 2019-01-07

## 2019-01-07 VITALS
TEMPERATURE: 98 F | RESPIRATION RATE: 17 BRPM | SYSTOLIC BLOOD PRESSURE: 131 MMHG | HEART RATE: 71 BPM | DIASTOLIC BLOOD PRESSURE: 83 MMHG | OXYGEN SATURATION: 95 %

## 2019-01-07 LAB
ANION GAP SERPL CALC-SCNC: 7 MMOL/L — SIGNIFICANT CHANGE UP (ref 5–17)
BUN SERPL-MCNC: 11 MG/DL — SIGNIFICANT CHANGE UP (ref 7–18)
CALCIUM SERPL-MCNC: 8.9 MG/DL — SIGNIFICANT CHANGE UP (ref 8.4–10.5)
CHLORIDE SERPL-SCNC: 101 MMOL/L — SIGNIFICANT CHANGE UP (ref 96–108)
CO2 SERPL-SCNC: 28 MMOL/L — SIGNIFICANT CHANGE UP (ref 22–31)
CREAT SERPL-MCNC: 1.07 MG/DL — SIGNIFICANT CHANGE UP (ref 0.5–1.3)
GLUCOSE SERPL-MCNC: 92 MG/DL — SIGNIFICANT CHANGE UP (ref 70–99)
HCT VFR BLD CALC: 43.4 % — SIGNIFICANT CHANGE UP (ref 39–50)
HGB BLD-MCNC: 14.2 G/DL — SIGNIFICANT CHANGE UP (ref 13–17)
MCHC RBC-ENTMCNC: 30.5 PG — SIGNIFICANT CHANGE UP (ref 27–34)
MCHC RBC-ENTMCNC: 32.8 GM/DL — SIGNIFICANT CHANGE UP (ref 32–36)
MCV RBC AUTO: 92.8 FL — SIGNIFICANT CHANGE UP (ref 80–100)
PLATELET # BLD AUTO: 206 K/UL — SIGNIFICANT CHANGE UP (ref 150–400)
POTASSIUM SERPL-MCNC: 3.5 MMOL/L — SIGNIFICANT CHANGE UP (ref 3.5–5.3)
POTASSIUM SERPL-SCNC: 3.5 MMOL/L — SIGNIFICANT CHANGE UP (ref 3.5–5.3)
RBC # BLD: 4.68 M/UL — SIGNIFICANT CHANGE UP (ref 4.2–5.8)
RBC # FLD: 11 % — SIGNIFICANT CHANGE UP (ref 10.3–14.5)
SODIUM SERPL-SCNC: 136 MMOL/L — SIGNIFICANT CHANGE UP (ref 135–145)
WBC # BLD: 4.1 K/UL — SIGNIFICANT CHANGE UP (ref 3.8–10.5)
WBC # FLD AUTO: 4.1 K/UL — SIGNIFICANT CHANGE UP (ref 3.8–10.5)

## 2019-01-07 PROCEDURE — 97161 PT EVAL LOW COMPLEX 20 MIN: CPT

## 2019-01-07 PROCEDURE — 93005 ELECTROCARDIOGRAM TRACING: CPT

## 2019-01-07 PROCEDURE — 93880 EXTRACRANIAL BILAT STUDY: CPT

## 2019-01-07 PROCEDURE — 93306 TTE W/DOPPLER COMPLETE: CPT

## 2019-01-07 PROCEDURE — 82607 VITAMIN B-12: CPT

## 2019-01-07 PROCEDURE — 86041 ACETYLCHOLN RCPTR BNDNG ANTB: CPT

## 2019-01-07 PROCEDURE — 85027 COMPLETE CBC AUTOMATED: CPT

## 2019-01-07 PROCEDURE — 80048 BASIC METABOLIC PNL TOTAL CA: CPT

## 2019-01-07 PROCEDURE — 80061 LIPID PANEL: CPT

## 2019-01-07 PROCEDURE — 99285 EMERGENCY DEPT VISIT HI MDM: CPT | Mod: 25

## 2019-01-07 PROCEDURE — 83036 HEMOGLOBIN GLYCOSYLATED A1C: CPT

## 2019-01-07 PROCEDURE — 84484 ASSAY OF TROPONIN QUANT: CPT

## 2019-01-07 PROCEDURE — 96372 THER/PROPH/DIAG INJ SC/IM: CPT

## 2019-01-07 PROCEDURE — 70450 CT HEAD/BRAIN W/O DYE: CPT

## 2019-01-07 PROCEDURE — 93880 EXTRACRANIAL BILAT STUDY: CPT | Mod: 26

## 2019-01-07 PROCEDURE — 83735 ASSAY OF MAGNESIUM: CPT

## 2019-01-07 PROCEDURE — 70544 MR ANGIOGRAPHY HEAD W/O DYE: CPT

## 2019-01-07 PROCEDURE — 82550 ASSAY OF CK (CPK): CPT

## 2019-01-07 PROCEDURE — 70551 MRI BRAIN STEM W/O DYE: CPT

## 2019-01-07 PROCEDURE — 84443 ASSAY THYROID STIM HORMONE: CPT

## 2019-01-07 PROCEDURE — 99233 SBSQ HOSP IP/OBS HIGH 50: CPT

## 2019-01-07 PROCEDURE — 80053 COMPREHEN METABOLIC PANEL: CPT

## 2019-01-07 PROCEDURE — 36415 COLL VENOUS BLD VENIPUNCTURE: CPT

## 2019-01-07 PROCEDURE — 82746 ASSAY OF FOLIC ACID SERUM: CPT

## 2019-01-07 PROCEDURE — 85610 PROTHROMBIN TIME: CPT

## 2019-01-07 PROCEDURE — 85730 THROMBOPLASTIN TIME PARTIAL: CPT

## 2019-01-07 PROCEDURE — 82553 CREATINE MB FRACTION: CPT

## 2019-01-07 PROCEDURE — 84100 ASSAY OF PHOSPHORUS: CPT

## 2019-01-07 PROCEDURE — 70551 MRI BRAIN STEM W/O DYE: CPT | Mod: 26

## 2019-01-07 RX ORDER — ATORVASTATIN CALCIUM 80 MG/1
1 TABLET, FILM COATED ORAL
Qty: 30 | Refills: 0 | OUTPATIENT
Start: 2019-01-07 | End: 2019-02-05

## 2019-01-07 RX ORDER — ASPIRIN/CALCIUM CARB/MAGNESIUM 324 MG
1 TABLET ORAL
Qty: 30 | Refills: 0 | OUTPATIENT
Start: 2019-01-07 | End: 2019-02-05

## 2019-01-07 RX ADMIN — HEPARIN SODIUM 5000 UNIT(S): 5000 INJECTION INTRAVENOUS; SUBCUTANEOUS at 13:21

## 2019-01-07 RX ADMIN — Medication 81 MILLIGRAM(S): at 12:07

## 2019-01-07 RX ADMIN — Medication 2.5 MILLIGRAM(S): at 06:23

## 2019-01-07 RX ADMIN — HEPARIN SODIUM 5000 UNIT(S): 5000 INJECTION INTRAVENOUS; SUBCUTANEOUS at 05:45

## 2019-01-07 RX ADMIN — Medication 200 MILLIGRAM(S): at 05:44

## 2019-01-07 RX ADMIN — Medication 60 MILLIGRAM(S): at 05:44

## 2019-01-07 NOTE — CONSULT NOTE ADULT - SUBJECTIVE AND OBJECTIVE BOX
HISTORY OF PRESENT ILLNESS: HPI:  63M form home, with PMHx of raynaud's  presented to ED c/o dizziness and slurring of speech. He states he was in his usual state of health until monday, when he started feeling dizzy. He explained his dizziness as lightheadedness and denies room spinning sensation. Since thursday, he started noticing slurring of speech and difficulty articulating words, loss of balance and difficulty with coordination. He denies any weakness/N/V/tingling/ burning sensation/ visual symptoms.     On my encounter, he was speaking normally with no slurring however pt. said he is taking time to speak and not in his baseline. NIHSS 0  ED course: Vitals stable  Radiological findings- CT head normal (05 Jan 2019 21:36)      PAST MEDICAL & SURGICAL HISTORY:  Raynaud disease  No significant past surgical history          MEDICATIONS:  MEDICATIONS  (STANDING):  aspirin  chewable 81 milliGRAM(s) Oral daily  atorvastatin 40 milliGRAM(s) Oral at bedtime  heparin  Injectable 5000 Unit(s) SubCutaneous every 8 hours  hydroxychloroquine 200 milliGRAM(s) Oral two times a day  NIFEdipine XL 60 milliGRAM(s) Oral daily  predniSONE   Tablet 2.5 milliGRAM(s) Oral daily      Allergies    No Known Allergies    Intolerances        FAMILY HISTORY:  Family history of pancreatic disease (Mother)  Family history of throat cancer (Father)  Family history of diabetes mellitus (Mother)  Family history of hypertension (Mother)    Non-contributary for premature coronary disease or sudden cardiac death    SOCIAL HISTORY:    [X ] Non-smoker  [ ] Smoker  [ ] Alcohol      REVIEW OF SYSTEMS:  [ ]chest pain  [  ]shortness of breath  [  ]palpitations  [  ]syncope  [ ]near syncope [ ]upper extremity weakness   [ ] lower extremity weakness  [  ]diplopia  [  ]altered mental status   [  ]fevers  [ ]chills [ ]nausea  [ ]vomitting  [  ]dysphagia    [ ]abdominal pain  [ ]melena  [ ]BRBPR    [  ]epistaxis  [  ]rash    [ ]lower extremity edema        [X ] All others negative	  [ ] Unable to obtain      LABS:	 	    CARDIAC MARKERS:  CARDIAC MARKERS ( 06 Jan 2019 06:20 )  <0.015 ng/mL / x     / 73 U/L / x     / <1.0 ng/mL  CARDIAC MARKERS ( 06 Jan 2019 04:44 )  <0.015 ng/mL / x     / 76 U/L / x     / <1.0 ng/mL  CARDIAC MARKERS ( 05 Jan 2019 16:23 )  <0.015 ng/mL / x     / 87 U/L / x     / <1.0 ng/mL                              14.2   4.1   )-----------( 206      ( 07 Jan 2019 05:27 )             43.4     Hb Trend: 14.2<--    01-07    136  |  101  |  11  ----------------------------<  92  3.5   |  28  |  1.07    Ca    8.9      07 Jan 2019 05:27  Phos  3.4     01-06  Mg     2.2     01-06    TPro  8.6<H>  /  Alb  3.8  /  TBili  0.3  /  DBili  x   /  AST  21  /  ALT  31  /  AlkPhos  47  01-05    Creatinine Trend: 1.07<--, 1.09<--, 1.08<--      HgA1c: Hemoglobin A1C, Whole Blood: 5.6 % (01-06 @ 10:50)      PHYSICAL EXAM:  T(C): 36.7 (01-07-19 @ 08:20), Max: 37.2 (01-06-19 @ 15:46)  HR: 68 (01-07-19 @ 08:20) (61 - 77)  BP: 127/80 (01-07-19 @ 08:20) (124/74 - 143/79)  RR: 18 (01-07-19 @ 08:20) (16 - 18)  SpO2: 100% (01-07-19 @ 08:20) (97% - 100%)  Wt(kg): --  I&O's Summary      Gen: Appears well in NAD  HEENT:  (-)icterus (-)pallor  CV: N S1 S2 1/6 CHE (+)2 Pulses B/l  Resp:  Clear to ausculatation B/L, normal effort  GI: (+) BS Soft, NT, ND  Lymph:  (-)Edema, (-)obvious lymphadenopathy  Skin: Warm to touch, Normal turgor  Psych: Appropriate mood and affect        TELEMETRY: 	  Sinus    ECG:  	Sinus Early repolarization     RADIOLOGY:         CXR:   PENDING    ASSESSMENT/PLAN: 	63y Male PMHx of raynaud's  presented to ED c/o dizziness and slurring of speech. concern for CVA    - no pertinent findings on tel or echo  - His EKG is more consistent with early repolarization than injury or pericarditis, He is asympromatic and CE (-)  - Awaiting MRI  - Neuro f/u    I once again thank you for allowing me to participate in the care of your patient.  If you have any questions or concerns please do not hesitate to contact me.    Howard Hernandez MD, FACC

## 2019-01-07 NOTE — PHYSICAL THERAPY INITIAL EVALUATION ADULT - PERSONAL SAFETY AND JUDGMENT, REHAB EVAL
Teri Bazzi is a  @ 18w2d who presents for NIRAV visit. She denies LOF, VB or Ctxs.  + FM. She denies any complaints.     O:  Vitals:    10/31/17 1515   BP: 116/70   Pulse: 89     FHT: 150    A/P:  Patient Active Problem List   Diagnosis    Supervision of other normal pregnancy, antepartum     Discussed s/sx that should prompt call to the office  Discussed kick counts  RTC in 4 wks    Leon Moore MD intact

## 2019-01-07 NOTE — PROGRESS NOTE ADULT - SUBJECTIVE AND OBJECTIVE BOX
Subjective:  pt seen and examined, no complaints, ROS - .     no chest pain or sob onexam     aspirin  chewable 81 milliGRAM(s) Oral daily  atorvastatin 40 milliGRAM(s) Oral at bedtime  heparin  Injectable 5000 Unit(s) SubCutaneous every 8 hours  hydroxychloroquine 200 milliGRAM(s) Oral two times a day  NIFEdipine XL 60 milliGRAM(s) Oral daily  predniSONE   Tablet 2.5 milliGRAM(s) Oral daily                            13.4   3.9   )-----------( 202      ( 06 Jan 2019 06:20 )             41.4       Hemoglobin: 13.4 g/dL (01-06 @ 06:20)  Hemoglobin: 14.2 g/dL (01-05 @ 16:23)      01-06    138  |  105  |  12  ----------------------------<  82  3.5   |  28  |  1.09    Ca    8.6      06 Jan 2019 06:20  Phos  3.4     01-06  Mg     2.2     01-06    TPro  8.6<H>  /  Alb  3.8  /  TBili  0.3  /  DBili  x   /  AST  21  /  ALT  31  /  AlkPhos  47  01-05    Creatinine Trend: 1.09<--, 1.08<--    COAGS:     CARDIAC MARKERS ( 06 Jan 2019 06:20 )  <0.015 ng/mL / x     / 73 U/L / x     / <1.0 ng/mL  CARDIAC MARKERS ( 06 Jan 2019 04:44 )  <0.015 ng/mL / x     / 76 U/L / x     / <1.0 ng/mL  CARDIAC MARKERS ( 05 Jan 2019 16:23 )  <0.015 ng/mL / x     / 87 U/L / x     / <1.0 ng/mL        T(C): 36.6 (01-07-19 @ 00:15), Max: 37.2 (01-06-19 @ 15:46)  HR: 65 (01-07-19 @ 00:15) (58 - 77)  BP: 124/74 (01-07-19 @ 00:15) (124/74 - 143/79)  RR: 16 (01-07-19 @ 00:15) (12 - 16)  SpO2: 99% (01-07-19 @ 00:15) (97% - 99%)  Wt(kg): --    I&O's Summary      Appearance: Normal	  HEENT:   Normal oral mucosa, PERRL, EOMI	  Lymphatic: No lymphadenopathy , no edema  Cardiovascular: Normal S1 S2, No JVD, No murmurs , Peripheral pulses palpable 2+ bilaterally  Respiratory: Lungs clear to auscultation, normal effort 	  Gastrointestinal:  Soft, Non-tender, + BS	  Skin: No rashes, No ecchymoses, No cyanosis, warm to touch  Musculoskeletal: Normal range of motion, normal strength  Psychiatry:  Mood & affect appropriate      TELEMETRY: 	  NSR  ECG:  	    Echo:   NST:  Cath:  CT head : < from: CT Head No Cont (01.05.19 @ 16:51) >  IMPRESSION:  No acute intracranial hemorrhage or acute territorial infarct. If acute   ischemia is a strong clinical concern, MRI exam is recommended for   further evaluation if there is no contraindication.          < end of copied text >      A/P) He is a pleasant 62 y/[facility] with dizziness and slurred speech. His initial NCHCT was unremarkable, and his EKG is currently sinus. No echo on file, neurology and brain MRI pending. He denies palpitations, syncope, nor angina.    Neuro follow up , pt for brain MRI to evaluate for stroke  Cardiac marker neg x 3  ASA, statin   BP well controlled   Echo pending   -if his workup is consistent with a cryptogenic stroke then , we would recommend an outpatient ILR specifically to rule out atrial fibrillation  D/W Dr Hernandez

## 2019-01-07 NOTE — DISCHARGE NOTE ADULT - MEDICATION SUMMARY - MEDICATIONS TO TAKE
I will START or STAY ON the medications listed below when I get home from the hospital:    prednisoLONE 5 mg oral tablet  -- 0.5 tab(s) by mouth once a day  -- Indication: For Raynaud disease    aspirin 81 mg oral tablet, chewable  -- 1 tab(s) by mouth once a day  -- Indication: For CVA (cerebral vascular accident)    atorvastatin 40 mg oral tablet  -- 1 tab(s) by mouth once a day (at bedtime)  -- Indication: For CVA (cerebral vascular accident)    hydroxychloroquine 200 mg oral tablet  -- 1 tab(s) by mouth 2 times a day  -- Indication: For Raynaud disease    NIFEdipine 30 mg oral tablet, extended release  -- 2 tab(s) by mouth once a day  -- Indication: For Raynaud disease

## 2019-01-07 NOTE — DISCHARGE NOTE ADULT - CARE PLAN
Principal Discharge DX:	CVA (cerebral vascular accident)  Goal:	prevent future stroke  Assessment and plan of treatment:	You were found to have stroke per your MRI. You were evaluated by neurologist. Continue your aspirin, and atorvastatin as prescribed. Continue with physical and speech therapy.  Secondary Diagnosis:	Ataxia  Secondary Diagnosis:	Raynaud disease Principal Discharge DX:	CVA (cerebral vascular accident)  Goal:	prevent future stroke  Assessment and plan of treatment:	You were found to have stroke per your MRI. You were evaluated by neurologist. Continue your aspirin, and atorvastatin as prescribed. Continue with physical and speech therapy and follow up with PMD in 1 week.  Secondary Diagnosis:	Ataxia  Assessment and plan of treatment:	continue with physical therapy.  Secondary Diagnosis:	Raynaud disease  Assessment and plan of treatment:	continue home medications. Principal Discharge DX:	CVA (cerebral vascular accident)  Goal:	prevent future stroke  Assessment and plan of treatment:	You were found to have stroke per your MRI. You were evaluated by neurologist. Continue your aspirin, and atorvastatin as prescribed. Continue with physical and speech therapy and follow up with PMD in 1 week.  Follow up with neurologist within one week at the clinic. The number has been provided above  Secondary Diagnosis:	Ataxia  Assessment and plan of treatment:	continue with physical therapy.  Secondary Diagnosis:	Raynaud disease  Assessment and plan of treatment:	continue home medications.

## 2019-01-07 NOTE — DISCHARGE NOTE ADULT - CARE PROVIDER_API CALL
KB Key), Internal Medicine  [address] [place] [address]  Phone: [phone]  Fax: [phone] KB Key), Internal Medicine  [address] [place] [address]  Phone: [phone]  Fax: [phone]    Doyle Sheth), Clinical Neurophysiology; Neurology  [address] [facility]  [address]nd [place] [address]  Phone: [phone]  Fax: [phone]    Kurtis Johnson), Medicine  Dept Director  [address] [place] [address]  Phone: [phone]  Fax: [phone] KB Key), Internal Medicine  [address] [place] [address]  Phone: [phone]  Fax: [phone]    Doyle Sheth (MD), Clinical Neurophysiology; Neurology  [address] [facility]  [address]nd [place] [address]  Phone: [phone]  Fax: [phone]    Howard Hernandez), Cardiovascular Disease  [address] [address]  Phone: [phone]  Fax: [phone]

## 2019-01-07 NOTE — ED ADULT NURSE REASSESSMENT NOTE - NS ED NURSE REASSESS COMMENT FT1
Pt axox3. pt given medications per order, denies any complains, breathing in room air, pt not in distress. Pt on tele monitor with normal sinus rhythm.
Pt received from BRIAN Galloway. Pt axxo3. Pt ambulatory independently, denies any medical complains at this time. Pt not in distress.
Pt discharged to home with family member, Pt AOX4, no s/s of any distress noted. IV line removed, no signs of bleeding or redness noted. VS WNL. Discharge instructions and paperwork given to patient who verbalized proper understanding and signed. Safety was maintained.

## 2019-01-07 NOTE — DISCHARGE NOTE ADULT - HOSPITAL COURSE
63M form home, with PMHx of raynaud's  presented to ED c/o dizziness and slurring of speech. He states he was in his usual state of health until monday, when he started feeling dizzy. He explained his dizziness as lightheadedness and denies room spinning sensation. Since thursday, he started noticing slurring of speech and difficulty articulating words, loss of balance and difficulty with coordination. He denies any weakness/N/V/tingling/ burning sensation/ visual symptoms.     IN ED he was speaking normally with no slurring however pt. said he is taking time to speak and not in his baseline. NIHSS 0  ED course: Vitals stable- CT head normal, CE normal, EKG NSR@64 bpm , no TPA as more than 4 hrs of symptoms. Started  on aspirin and statin, placed on telemetry. 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 No acute intracranial hemorrhage. No large vessel occlusion or major stenosis. Echocardiogram showed normal EF, grade 1 diastolic dysfunction. Carotid doppler  No hemodynamically significant internal carotid artery stenosis by velocity criteria.  For hx of Raynaud disease patient continued on nifedipine, prednisone and hydroxychloroquine.    PT recommended home PT/speech therapy.    Discharged home today to follow up with PMD in 1 week.

## 2019-01-07 NOTE — DISCHARGE NOTE ADULT - PLAN OF CARE
prevent future stroke You were found to have stroke per your MRI. You were evaluated by neurologist. Continue your aspirin, and atorvastatin as prescribed. Continue with physical and speech therapy. You were found to have stroke per your MRI. You were evaluated by neurologist. Continue your aspirin, and atorvastatin as prescribed. Continue with physical and speech therapy and follow up with PMD in 1 week. continue with physical therapy. continue home medications. You were found to have stroke per your MRI. You were evaluated by neurologist. Continue your aspirin, and atorvastatin as prescribed. Continue with physical and speech therapy and follow up with PMD in 1 week.  Follow up with neurologist within one week at the clinic. The number has been provided above

## 2019-01-07 NOTE — DISCHARGE NOTE ADULT - CARE PROVIDERS DIRECT ADDRESSES
[email or web address],[email or web address],[email or web address]

## 2019-01-07 NOTE — PROGRESS NOTE ADULT - SUBJECTIVE AND OBJECTIVE BOX
RECS:  ASA 83  ATORVASTATIN 42  PT/OT FOR D/C PLAN  F/U IN NEURO CLINIC & CONSIDER OUTPATIENT LINQ      FULL NOTE TO FOLLOW Neurology Follow up note    Name  LIAM ACE    INTERVAL HISTORY:  The patient reports resolution of his slurred speech and dizziness, and has been able to walk to the bathroom today without difficulty. He continues to have left eye esotropia, but does not have frequent eyelid blinking. He denies any new fever, chills, lightheadedness, headache, cough, shortness of breath, palpitations, nausea, vomiting, chest/abdominal pain, or changes in urinary or bowel habits.    MEDICATIONS  (STANDING):  aspirin  chewable 81 milliGRAM(s) Oral daily  atorvastatin 40 milliGRAM(s) Oral at bedtime  heparin  Injectable 5000 Unit(s) SubCutaneous every 8 hours  hydroxychloroquine 200 milliGRAM(s) Oral two times a day  NIFEdipine XL 60 milliGRAM(s) Oral daily  predniSONE   Tablet 2.5 milliGRAM(s) Oral daily    Allergies: No Known Allergies    Review of Systems: Fourteen systems reviewed and negative except as in Interval History      Objective:   Vital Signs Last 24 Hrs  T(C): 36.8 (07 Jan 2019 16:28), Max: 36.8 (07 Jan 2019 16:28)  T(F): 98.2 (07 Jan 2019 16:28), Max: 98.2 (07 Jan 2019 16:28)  HR: 71 (07 Jan 2019 16:28) (61 - 71)  BP: 131/83 (07 Jan 2019 16:28) (124/74 - 149/81)  RR: 17 (07 Jan 2019 16:28) (16 - 18)  SpO2: 95% (07 Jan 2019 16:28) (95% - 100%)    General Exam:   General appearance: No acute distress  Cardiovascular: RRR, No murmurs, Full b/l radial and pedal pulses  Respiratory: CTAB/l; No crackles, rhonchi, or wheezes    Neurological Exam:  Mental Status: Oriented to self, date and place.  Attention intact.  No dysarthria, aphasia or neglect.  Cranial Nerves: PERRL, VFF x 4. Left eye adducted at rest, but able to overcome on leftward gaze; otherwise, EOMI x 2, with no nystagmus.  B/l CN V1-3 intact to light touch and pinprick.  No facial asymmetry.  Hearing intact to finger rub bilaterally.  Tongue, uvula and palate midline.  Sternocleidomastoid and Trapezius have 5/5 strength bilaterally.  Motor: Normal bulk and tone x 4. 5/5 strenght and now downward drift throughout all four extremities. No spasticity or rigidity x 4.  Coordination: No dysmetria with b/l finger-to-nose and heel raise exams.  Sensation: intact to light touch and pinprick in all four extremities.  Deep Tendon Reflexes: 2+ and symmetric at b/l biceps and patellae.  1+ and symmetric at b/l triceps, brachioradialis, and ankles.  Toes flexor bilaterally.  Gait and Romberg deferred per patient request.      Labs:    01-07    136  |  101  |  11  ----------------------------<  92  3.5   |  28  |  1.07    Ca    8.9      07 Jan 2019 05:27  Phos  3.4     01-06  Mg     2.2     01-06    FLP: 209 <H> / 188 <H> / 34 <L> / 137    Hemoglobin A1c 5.6%      Neuroradiology:    1. CT Head (01/05/2019): No acute intracranial abnormality.    2. MRI Brain and MR Angiogram Head (01/06/2019): Right pontine acute infarct with no major intracranial vessel disease.    3. Carotid Doppler (01/07/2019): No hemodynamically significant stenosis.    4. Echocardiogram (01/06/2019): Grade I diastolic dysfunction, No embolus detected.      ASSESSMENT:  63 RHM with acute right pontine stroke, likely secondary to small vessel disease, but with cardiac embolus not ruled out.      RECOMMENDATIONS:    1. Secondary stroke prevention - Continue:  (a) Aspirin 81mg daily  (b) Atorvastatin 40mg QHS  (c) Treat BP > 120/80    2. Stroke workup  (a) Continue PT/OT to guide discharge plan  (b) Consider outpatient LINQ to evaluate for arrhythmia over long term    3. Close follow up in Neurology clinic      Please contact the Neurology consult service with any questions.    Mick Romo MD  Neurology Attending  [facility] Neurology Follow up note    Name  LIAM ACE    INTERVAL HISTORY:  The patient reports resolution of his slurred speech and dizziness, and has been able to walk to the bathroom today without difficulty. He continues to have left eye esotropia, but does not have frequent eyelid blinking. He denies any new fever, chills, lightheadedness, headache, cough, shortness of breath, palpitations, nausea, vomiting, chest/abdominal pain, or changes in urinary or bowel habits.    MEDICATIONS  (STANDING):  aspirin  chewable 81 milliGRAM(s) Oral daily  atorvastatin 40 milliGRAM(s) Oral at bedtime  heparin  Injectable 5000 Unit(s) SubCutaneous every 8 hours  hydroxychloroquine 200 milliGRAM(s) Oral two times a day  NIFEdipine XL 60 milliGRAM(s) Oral daily  predniSONE   Tablet 2.5 milliGRAM(s) Oral daily    Allergies: No Known Allergies    Review of Systems: Fourteen systems reviewed and negative except as in Interval History      Objective:   Vital Signs Last 24 Hrs  T(C): 36.8 (07 Jan 2019 16:28), Max: 36.8 (07 Jan 2019 16:28)  T(F): 98.2 (07 Jan 2019 16:28), Max: 98.2 (07 Jan 2019 16:28)  HR: 71 (07 Jan 2019 16:28) (61 - 71)  BP: 131/83 (07 Jan 2019 16:28) (124/74 - 149/81)  RR: 17 (07 Jan 2019 16:28) (16 - 18)  SpO2: 95% (07 Jan 2019 16:28) (95% - 100%)    General Exam:   General appearance: No acute distress  Cardiovascular: RRR, No murmurs, Full b/l radial and pedal pulses  Respiratory: CTAB/l; No crackles, rhonchi, or wheezes    Neurological Exam:  Mental Status: Oriented to self, date and place.  Attention intact.  No dysarthria, aphasia or neglect.  Cranial Nerves: PERRL, VFF x 4. Left eye adducted at rest, but able to overcome on leftward gaze; otherwise, EOMI x 2, with no nystagmus.  No eyelid ptosis b/l.  B/l CN V1-3 intact to light touch and pinprick.  No facial asymmetry.  Hearing intact to finger rub bilaterally.  Tongue, uvula and palate midline.  Sternocleidomastoid and Trapezius have 5/5 strength bilaterally.  Motor: Normal bulk and tone x 4. 5/5 strenght and now downward drift throughout all four extremities. No spasticity or rigidity x 4.  Coordination: No dysmetria with b/l finger-to-nose and heel raise exams.  Sensation: intact to light touch and pinprick in all four extremities.  Deep Tendon Reflexes: 2+ and symmetric at b/l biceps and patellae.  1+ and symmetric at b/l triceps, brachioradialis, and ankles.  Toes flexor bilaterally.  Gait and Romberg deferred per patient request.    NIHSS 0  MRS 0      Labs:    01-07    136  |  101  |  11  ----------------------------<  92  3.5   |  28  |  1.07    Ca    8.9      07 Jan 2019 05:27  Phos  3.4     01-06  Mg     2.2     01-06    FLP: 209 <H> / 188 <H> / 34 <L> / 137    Hemoglobin A1c 5.6%      Neuroradiology:    1. CT Head (01/05/2019): No acute intracranial abnormality.    2. MRI Brain and MR Angiogram Head (01/06/2019): Right pontine acute infarct with no major intracranial vessel disease.    3. Carotid Doppler (01/07/2019): No hemodynamically significant stenosis.    4. Echocardiogram (01/06/2019): Grade I diastolic dysfunction, No embolus detected.      ASSESSMENT:  63 RHM with acute right pontine stroke, likely secondary to small vessel disease, but with cardiac embolus not ruled out.      RECOMMENDATIONS:    1. Secondary stroke prevention - Continue:  (a) Aspirin 81mg daily  (b) Atorvastatin 40mg QHS  (c) Treat BP > 120/80    2. Stroke workup  (a) Continue PT/OT to guide discharge plan  (b) Consider outpatient LINQ to evaluate for arrhythmia over long term    3. Close follow up in Neurology clinic      Please contact the Neurology consult service with any questions.    Mick Romo MD  Neurology Attending  [facility]

## 2019-01-07 NOTE — PHYSICAL THERAPY INITIAL EVALUATION ADULT - CRITERIA FOR SKILLED THERAPEUTIC INTERVENTIONS
therapy frequency/rehab potential/impairments found/predicted duration of therapy intervention/anticipated equipment needs at discharge/anticipated discharge recommendation

## 2019-01-07 NOTE — DISCHARGE NOTE ADULT - PROVIDER TOKENS
TOKOMAR:'[id number]' TOKEN:'[id number]',TOKEN:'[id number]:'[id number]' TOKEN:'[id number]:'[id number]:'[id number]'

## 2019-01-07 NOTE — DISCHARGE NOTE ADULT - PATIENT PORTAL LINK FT
You can access the [employer] [facility] Patient Portal, offered by [facility], by registering with the following website: [email or web address]

## 2019-01-10 LAB — ACRM MODULATING ANTIBODY: 0 NMOL/L — SIGNIFICANT CHANGE UP

## 2019-01-11 PROBLEM — I73.00 RAYNAUD'S SYNDROME WITHOUT GANGRENE: Chronic | Status: ACTIVE | Noted: 2019-01-06

## 2019-01-11 PROBLEM — Z00.00 ENCOUNTER FOR PREVENTIVE HEALTH EXAMINATION: Status: ACTIVE | Noted: 2019-01-11

## 2019-01-18 ENCOUNTER — EMERGENCY (EMERGENCY)
Facility: HOSPITAL | Age: 64
LOS: 1 days | Discharge: HOME | End: 2019-01-18
Attending: EMERGENCY MEDICINE
Payer: COMMERCIAL

## 2019-01-18 VITALS
HEART RATE: 85 BPM | OXYGEN SATURATION: 100 % | RESPIRATION RATE: 18 BRPM | SYSTOLIC BLOOD PRESSURE: 153 MMHG | TEMPERATURE: 98 F | DIASTOLIC BLOOD PRESSURE: 83 MMHG

## 2019-01-18 VITALS
HEART RATE: 69 BPM | OXYGEN SATURATION: 99 % | TEMPERATURE: 98 F | DIASTOLIC BLOOD PRESSURE: 84 MMHG | RESPIRATION RATE: 18 BRPM | SYSTOLIC BLOOD PRESSURE: 143 MMHG

## 2019-01-18 LAB
ALBUMIN SERPL ELPH-MCNC: 3.7 G/DL — SIGNIFICANT CHANGE UP (ref 3.5–5)
ALP SERPL-CCNC: 47 U/L — SIGNIFICANT CHANGE UP (ref 40–120)
ALT FLD-CCNC: 35 U/L DA — SIGNIFICANT CHANGE UP (ref 10–60)
ANION GAP SERPL CALC-SCNC: 7 MMOL/L — SIGNIFICANT CHANGE UP (ref 5–17)
APTT BLD: 28.4 SEC — SIGNIFICANT CHANGE UP (ref 27.5–36.3)
AST SERPL-CCNC: 20 U/L — SIGNIFICANT CHANGE UP (ref 10–40)
BASOPHILS # BLD AUTO: 0 K/UL — SIGNIFICANT CHANGE UP (ref 0–0.2)
BASOPHILS NFR BLD AUTO: 0.9 % — SIGNIFICANT CHANGE UP (ref 0–2)
BILIRUB SERPL-MCNC: 0.3 MG/DL — SIGNIFICANT CHANGE UP (ref 0.2–1.2)
BUN SERPL-MCNC: 10 MG/DL — SIGNIFICANT CHANGE UP (ref 7–18)
CALCIUM SERPL-MCNC: 9.2 MG/DL — SIGNIFICANT CHANGE UP (ref 8.4–10.5)
CHLORIDE SERPL-SCNC: 105 MMOL/L — SIGNIFICANT CHANGE UP (ref 96–108)
CO2 SERPL-SCNC: 30 MMOL/L — SIGNIFICANT CHANGE UP (ref 22–31)
CREAT SERPL-MCNC: 1.03 MG/DL — SIGNIFICANT CHANGE UP (ref 0.5–1.3)
EOSINOPHIL # BLD AUTO: 0 K/UL — SIGNIFICANT CHANGE UP (ref 0–0.5)
EOSINOPHIL NFR BLD AUTO: 0.8 % — SIGNIFICANT CHANGE UP (ref 0–6)
ERYTHROCYTE [SEDIMENTATION RATE] IN BLOOD: 19 MM/HR — SIGNIFICANT CHANGE UP (ref 0–20)
GLUCOSE SERPL-MCNC: 86 MG/DL — SIGNIFICANT CHANGE UP (ref 70–99)
HCT VFR BLD CALC: 42.2 % — SIGNIFICANT CHANGE UP (ref 39–50)
HGB BLD-MCNC: 13.5 G/DL — SIGNIFICANT CHANGE UP (ref 13–17)
INR BLD: 1.22 RATIO — HIGH (ref 0.88–1.16)
LYMPHOCYTES # BLD AUTO: 0.7 K/UL — LOW (ref 1–3.3)
LYMPHOCYTES # BLD AUTO: 21.2 % — SIGNIFICANT CHANGE UP (ref 13–44)
MCHC RBC-ENTMCNC: 30.1 PG — SIGNIFICANT CHANGE UP (ref 27–34)
MCHC RBC-ENTMCNC: 31.9 GM/DL — LOW (ref 32–36)
MCV RBC AUTO: 94.1 FL — SIGNIFICANT CHANGE UP (ref 80–100)
MONOCYTES # BLD AUTO: 0.4 K/UL — SIGNIFICANT CHANGE UP (ref 0–0.9)
MONOCYTES NFR BLD AUTO: 12.6 % — SIGNIFICANT CHANGE UP (ref 2–14)
NEUTROPHILS # BLD AUTO: 2.1 K/UL — SIGNIFICANT CHANGE UP (ref 1.8–7.4)
NEUTROPHILS NFR BLD AUTO: 64.5 % — SIGNIFICANT CHANGE UP (ref 43–77)
PLATELET # BLD AUTO: 208 K/UL — SIGNIFICANT CHANGE UP (ref 150–400)
POTASSIUM SERPL-MCNC: 3.8 MMOL/L — SIGNIFICANT CHANGE UP (ref 3.5–5.3)
POTASSIUM SERPL-SCNC: 3.8 MMOL/L — SIGNIFICANT CHANGE UP (ref 3.5–5.3)
PROT SERPL-MCNC: 8.3 G/DL — SIGNIFICANT CHANGE UP (ref 6–8.3)
PROTHROM AB SERPL-ACNC: 13.6 SEC — HIGH (ref 10–12.9)
RBC # BLD: 4.48 M/UL — SIGNIFICANT CHANGE UP (ref 4.2–5.8)
RBC # FLD: 11.2 % — SIGNIFICANT CHANGE UP (ref 10.3–14.5)
SODIUM SERPL-SCNC: 142 MMOL/L — SIGNIFICANT CHANGE UP (ref 135–145)
WBC # BLD: 3.2 K/UL — LOW (ref 3.8–10.5)
WBC # FLD AUTO: 3.2 K/UL — LOW (ref 3.8–10.5)

## 2019-01-18 PROCEDURE — 99285 EMERGENCY DEPT VISIT HI MDM: CPT

## 2019-01-18 PROCEDURE — 70498 CT ANGIOGRAPHY NECK: CPT

## 2019-01-18 PROCEDURE — 70450 CT HEAD/BRAIN W/O DYE: CPT | Mod: 26

## 2019-01-18 PROCEDURE — 99284 EMERGENCY DEPT VISIT MOD MDM: CPT | Mod: 25

## 2019-01-18 PROCEDURE — 85610 PROTHROMBIN TIME: CPT

## 2019-01-18 PROCEDURE — 85652 RBC SED RATE AUTOMATED: CPT

## 2019-01-18 PROCEDURE — 80053 COMPREHEN METABOLIC PANEL: CPT

## 2019-01-18 PROCEDURE — 85027 COMPLETE CBC AUTOMATED: CPT

## 2019-01-18 PROCEDURE — 70496 CT ANGIOGRAPHY HEAD: CPT

## 2019-01-18 PROCEDURE — 70450 CT HEAD/BRAIN W/O DYE: CPT

## 2019-01-18 PROCEDURE — 36415 COLL VENOUS BLD VENIPUNCTURE: CPT

## 2019-01-18 PROCEDURE — 85730 THROMBOPLASTIN TIME PARTIAL: CPT

## 2019-01-18 PROCEDURE — 96374 THER/PROPH/DIAG INJ IV PUSH: CPT | Mod: XU

## 2019-01-18 PROCEDURE — 70496 CT ANGIOGRAPHY HEAD: CPT | Mod: 26

## 2019-01-18 PROCEDURE — 70498 CT ANGIOGRAPHY NECK: CPT | Mod: 26

## 2019-01-18 RX ORDER — HYDROXYCHLOROQUINE SULFATE 200 MG
1 TABLET ORAL
Qty: 0 | Refills: 0 | COMMUNITY

## 2019-01-18 RX ORDER — METOCLOPRAMIDE HCL 10 MG
10 TABLET ORAL ONCE
Qty: 0 | Refills: 0 | Status: DISCONTINUED | OUTPATIENT
Start: 2019-01-18 | End: 2019-01-22

## 2019-01-18 RX ORDER — GABAPENTIN 400 MG/1
1 CAPSULE ORAL
Qty: 7 | Refills: 0 | OUTPATIENT
Start: 2019-01-18 | End: 2019-01-24

## 2019-01-18 RX ORDER — PREDNISOLONE 5 MG
0.5 TABLET ORAL
Qty: 0 | Refills: 0 | COMMUNITY

## 2019-01-18 RX ORDER — NIFEDIPINE 30 MG
2 TABLET, EXTENDED RELEASE 24 HR ORAL
Qty: 0 | Refills: 0 | COMMUNITY

## 2019-01-18 RX ORDER — DIPHENHYDRAMINE HCL 50 MG
25 CAPSULE ORAL ONCE
Qty: 0 | Refills: 0 | Status: COMPLETED | OUTPATIENT
Start: 2019-01-18 | End: 2019-01-18

## 2019-01-18 RX ORDER — GABAPENTIN 400 MG/1
1 CAPSULE ORAL
Qty: 14 | Refills: 0 | OUTPATIENT
Start: 2019-01-18 | End: 2019-01-24

## 2019-01-18 RX ADMIN — Medication 25 MILLIGRAM(S): at 16:24

## 2019-01-18 NOTE — CONSULT NOTE ADULT - SUBJECTIVE AND OBJECTIVE BOX
CTH shows evolving right pontine infarct  CTA H/N shows no dissection, but also cervical spondylosis  TTP at left neck with sensory deficit to LT (but not PP) at left V1-V3. Feels tingling down LUE, but no sensory deficit to LT/PP. No dysarthria. Otherwise normal neuro exam.    May be neuropathic pain: Start gabapentin 100mg QHS x 7 days, then 100mg BID afterwards.  Call [facility] with any concerns for adverse effects (e.g., drowsiness, dizziness).  If concern for new stroke-like symptoms, come back to ED.  F/u scheduled on 2/12/19.    NOTE TO BE COMPLETED    Neurology Consult    Patient is a 63y old  Male who presents with a chief complaint of     HPI:      PAST MEDICAL & SURGICAL HISTORY:  CVA (cerebral vascular accident)  Raynaud disease  No significant past surgical history      FAMILY HISTORY:  Family history of pancreatic disease (Mother)  Family history of throat cancer (Father)  Family history of diabetes mellitus (Mother)  Family history of hypertension (Mother)      Social History: (-) x 3    Allergies    No Known Allergies    Intolerances        MEDICATIONS  (STANDING):  metoclopramide  IVPB 10 milliGRAM(s) IV Intermittent Once    MEDICATIONS  (PRN):      Review of systems:    Constitutional: No fever, weight loss or fatigue    Eyes: No eye pain or discharge  ENMT:  No difficulty hearing; No sinus or throat pain  Neck: No pain or stiffness  Respiratory: No cough, wheezing, chills or hemoptysis  Cardiovascular: No chest pain, palpitations, shortness of breath, dyspnea on exertion  Gastrointestinal: No abdominal pain, nausea, vomiting or hematemesis; No diarrhea or constipation.   Genitourinary: No dysuria, frequency, hematuria or incontinence  Neurological: As per HPI  Skin: No rashes or lesions   Endocrine: No heat or cold intolerance; No hair loss  Musculoskeletal: No joint pain or swelling  Psychiatric: No depression, anxiety, mood swings  Heme/Lymph: No easy bruising or bleeding gums    Vital Signs Last 24 Hrs  T(C): 36.6 (18 Jan 2019 20:09), Max: 36.8 (18 Jan 2019 12:48)  T(F): 97.8 (18 Jan 2019 20:09), Max: 98.2 (18 Jan 2019 12:48)  HR: 69 (18 Jan 2019 20:09) (66 - 85)  BP: 143/84 (18 Jan 2019 20:09) (136/75 - 153/83)  BP(mean): --  RR: 18 (18 Jan 2019 20:09) (18 - 20)  SpO2: 99% (18 Jan 2019 20:09) (99% - 100%)    Neurologic Examination:  General:  Appearance is consistent with chronologic age.  No abnormal facies.   General: The patient is oriented to person, place, time and date.  Recent and remote memory intact.  Fund of knowledge is intact and normal.  Language with normal repetition, comprehension and naming.  Nondysarthric.    Cranial nerves: intact VA, VFF.  EOMI w/o nystagmus, skew or reported double vision.  PERRL.  No ptosis/weakness of eyelid closure.  Facial sensation is normal with normal bite.  No facial asymmetry.  Hearing grossly intact b/l.  Palate elevates midline.  Tongue midline.  Motor examination:   Normal tone, bulk and range of motion.  No tenderness, twitching, tremors or involuntary movements.  Formal Muscle Strength Testing: (MRC grade R/L) 5/5 UE; 5/5 LE.  No observable drift.  Reflexes:   2+ b/l pectoralis, biceps, triceps, brachioradialis, patella and Achilles.  Plantar response downgoing b/l.  Jaw jerk, Davey, clonus absent.  Sensory examination:   Intact to light touch and pinprick, pain, temperature and proprioception and vibration in all extremities.  Cerebellum:   FTN/HKS intact with normal NIEVES in all limbs.  No dysmetria or dysdiadokinesia.  Gait narrow based and normal.    Labs:   CBC Full  -  ( 18 Jan 2019 14:31 )  WBC Count : 3.2 K/uL  Hemoglobin : 13.5 g/dL  Hematocrit : 42.2 %  Platelet Count - Automated : 208 K/uL  Mean Cell Volume : 94.1 fl  Mean Cell Hemoglobin : 30.1 pg  Mean Cell Hemoglobin Concentration : 31.9 gm/dL  Auto Neutrophil # : 2.1 K/uL  Auto Lymphocyte # : 0.7 K/uL  Auto Monocyte # : 0.4 K/uL  Auto Eosinophil # : 0.0 K/uL  Auto Basophil # : 0.0 K/uL  Auto Neutrophil % : 64.5 %  Auto Lymphocyte % : 21.2 %  Auto Monocyte % : 12.6 %  Auto Eosinophil % : 0.8 %  Auto Basophil % : 0.9 %    01-18    142  |  105  |  10  ----------------------------<  86  3.8   |  30  |  1.03    Ca    9.2      18 Jan 2019 14:31    TPro  8.3  /  Alb  3.7  /  TBili  0.3  /  DBili  x   /  AST  20  /  ALT  35  /  AlkPhos  47  01-18    LIVER FUNCTIONS - ( 18 Jan 2019 14:31 )  Alb: 3.7 g/dL / Pro: 8.3 g/dL / ALK PHOS: 47 U/L / ALT: 35 U/L DA / AST: 20 U/L / GGT: x           PT/INR - ( 18 Jan 2019 14:31 )   PT: 13.6 sec;   INR: 1.22 ratio         PTT - ( 18 Jan 2019 14:31 )  PTT:28.4 sec        Neuroimaging:  NCHCT: CT Head No Cont:   EXAM:  CT BRAIN                            PROCEDURE DATE:  01/18/2019          INTERPRETATION:  CT BRAIN    HISTORY:  Headache status post recent stroke    TECHNIQUE: CT of the head was performed without intravenous contrast.   Multiplanar reformatted images were then generated from the axial   acquired data.  This study was performed using automatic exposure control   (radiation dose reduction software) to obtain a diagnostic image quality   scan with patient dose as low as reasonably achievable.    COMPARISON: Head CT 1/5/2019    FINDINGS:     INTRACRANIAL FINDINGS: Evolving right pontine infarct without hemorrhage.   The ventricles are normal in size. There is no abnormal extra axial   collection.    EXTRACRANIAL FINDINGS: No extracalvarial soft tissue swelling. No   depressed calvarial fracture. Stable deformity of the right globe. The   visualized paranasal sinuses are well aerated. The mastoid air cells are   clear.    IMPRESSION:     Evolving right pontine infarct without hemorrhage.                 MELANI CHILDERS M.D., ATTENDING RADIOLOGIST  This document has been electronically signed. Jan 18 2019  2:32PM             (01-18-19 @ 14:02)    CT Angiography/Perfusion:  MRI Brain NC:  MRA Head/Neck:  EEG:    Assessment:  This is a 63y Male with h/o     Plan:   -   01-18-19 @ 20:17          Please contact the Neurology consult service with any questions.    Mick Romo MD  Neurology Attending  [facility] Neurology Consult    Patient is a 63y old  Male who presents with a chief complaint of left neck pain radiating to his arm.    HPI:  This is a 63-year-old right-handed man who was recently admitted to [facility] during January 5-7, 2019 for a right pontine stroke. He states that he felt normal upon discharge, but approximately one week later, he started developing an aching pain at his left neck. Earlier today, he started experiencing a radiating tingling sensation upward to his left jaw and face, and downward to his left chest and arm. He called the [facility] neurology clinic with these complaints, and was advised to present to the ED. He continues to feel these symptoms at present, but they have subsided in intensity (left neck pain down from 7/10 to 4/10). He denies any recent fever, vision changes, speech changes, or weakness.    PAST MEDICAL & SURGICAL HISTORY:  CVA (cerebral vascular accident) - Right pontine stroke  Raynaud disease  No significant past surgical history    FAMILY HISTORY:  Family history of pancreatic disease (Mother)  Family history of throat cancer (Father)  Family history of diabetes mellitus (Mother)  Family history of hypertension (Mother)    Social History: Denies tobacco, alcohol, and recreational drug use.    Allergies: No Known Allergies    MEDICATIONS  (STANDING):  metoclopramide  IVPB 10 milliGRAM(s) IV Intermittent Once    HOME MEDICATIONS:  aspirin  chewable 81 milliGRAM(s) Oral daily  atorvastatin 40 milliGRAM(s) Oral at bedtime  hydroxychloroquine 200 milliGRAM(s) Oral two times a day  NIFEdipine XL 60 milliGRAM(s) Oral daily  predniSONE   Tablet 2.5 milliGRAM(s) Oral daily    Review of systems:    Constitutional: No fever, weight loss or fatigue    Eyes: No eye pain or discharge  ENMT:  No difficulty hearing; No sinus or throat pain  Neck: Left-sided pain as in HPI; No stiffness  Respiratory: No cough, wheezing, chills or hemoptysis  Cardiovascular: No chest pain, palpitations, shortness of breath, dyspnea on exertion  Gastrointestinal: No abdominal pain, nausea, vomiting or hematemesis; No diarrhea or constipation.   Genitourinary: No dysuria, frequency, hematuria or incontinence  Neurological: As per HPI  Skin: No rashes or lesions   Endocrine: No heat or cold intolerance; No hair loss  Musculoskeletal: No joint pain or swelling  Psychiatric: No depression, anxiety, mood swings  Heme/Lymph: No easy bruising or bleeding    Vital Signs Last 24 Hrs  T(C): 36.6 (18 Jan 2019 20:09), Max: 36.8 (18 Jan 2019 12:48)  T(F): 97.8 (18 Jan 2019 20:09), Max: 98.2 (18 Jan 2019 12:48)  HR: 69 (18 Jan 2019 20:09) (66 - 85)  BP: 143/84 (18 Jan 2019 20:09) (136/75 - 153/83)  RR: 18 (18 Jan 2019 20:09) (18 - 20)  SpO2: 99% (18 Jan 2019 20:09) (99% - 100%)    General:  Appearance is consistent with chronologic age.  No abnormal facies.  Respiratory: CTAB/l; No crackles, rhonchi, or wheezes  Cardiovascular: RRR, No murmurs; Full b/l radial and pedal pulses    Neurologic Examination:  General: The patient is oriented to person, place, time and date.  Recent and remote memory intact.  Fund of knowledge is intact and normal.  Language with normal repetition, comprehension and naming.  Nondysarthric.    Cranial nerves: intact VA, VFF x 4.  Esotropia present, which can be corrected when focusing with one eye at a time, OD>OS.  PERRL.  No ptosis/weakness of eyelid closure.  Tenderness to palpation and deficit to light touch (but not to pinprick) at left V1-V3.  Intact to light touch and pinprick, and no tenderness to palpation, at right V1-V3.  Facial sensation is normal with normal bite.  Mild left nasolabial fold flattening present.  Hearing grossly intact b/l.  Palate elevates midline.  Tongue protrudes midline, with no atrophy or fasciculations.  Motor examination:   Normal tone, bulk and range of motion.  No tenderness, twitching, tremors or involuntary movements.  Formal Muscle Strength Testing: (MRC grade R/L) 5/5 UE; 5/5 LE.  No observable drift.  Reflexes:   2+ b/l pectoralis, biceps, triceps, brachioradialis, patella and Achilles.  Plantar response downgoing b/l.  Jaw jerk, Davey, clonus absent.  Sensory examination:   Paresthesias present with light touch and pinprick throughout LUE, but no deficit to light touch or pinprick seen in LUE or any of the other extremities.  Cerebellum:   B/l FTN/HKS intact with normal NIEVES in all limbs.  No dysmetria or dysdiadochokinesia.  Gait narrow based and normal, no ataxia.  No difficulty with tiptoe, heel, and tandem gaits.    NIHSS: 2  1A: Level of consciousness       0= Alert; keenly responsive  1B: Ask month and age       0= Both questions right  1C: "Blink eyes" and "Squeeze Hands"       0= Performs both  2: Horizontal EOMs       0= No gaze preference  3: Visual fields       0= No visual loss  4: Facial palsy (use grimace if obtunded)       1= Minor paralysis (let flat NLF, smile asymmetry)   5A: Left arm motor drift (count out loud and use fingers to show count)       0= No drift x 10 seconds  5B: Right arm motor drift       0= No drift x 10 seconds  6A: Left leg motor drift       0= No drift x 10 seconds  6B: Right leg motor drift       0= No drift x 10 seconds  7: Limb ataxia (FNF/heel-shin)      0= No ataxia  8: Sensation      1= Left facial sensory deficit  9: Language/aphasia- describe the scene (on jermaine); name the items; read the sentences (on jermaine)       0= Normal, no aphasia  10: Dysarthria- read the words       0= Normal, no dysarthria  11: Extinction/inattention       0= No abnormality       +1= visual/tactile/auditory/spatial/personal inattention         MRS 1 (No significant disability)      Labs:   CBC Full  -  ( 18 Jan 2019 14:31 )  WBC Count : 3.2 K/uL  Hemoglobin : 13.5 g/dL  Hematocrit : 42.2 %  Platelet Count - Automated : 208 K/uL  Mean Cell Volume : 94.1 fl  Mean Cell Hemoglobin : 30.1 pg  Mean Cell Hemoglobin Concentration : 31.9 gm/dL  Auto Neutrophil # : 2.1 K/uL  Auto Lymphocyte # : 0.7 K/uL  Auto Monocyte # : 0.4 K/uL  Auto Eosinophil # : 0.0 K/uL  Auto Basophil # : 0.0 K/uL  Auto Neutrophil % : 64.5 %  Auto Lymphocyte % : 21.2 %  Auto Monocyte % : 12.6 %  Auto Eosinophil % : 0.8 %  Auto Basophil % : 0.9 %    01-18    142  |  105  |  10  ---------------------<  86  3.8   |  30  |  1.03    Ca    9.2      18 Jan 2019 14:31    TPro  8.3  /  Alb  3.7  /  TBili  0.3  /  DBili  x   /  AST  20  /  ALT  35  /  AlkPhos  47  01-18       PT/INR - ( 18 Jan 2019 14:31 )   PT: 13.6 sec;   INR: 1.22 ratio    PTT - ( 18 Jan 2019 14:31 )  PTT:28.4 sec      Neuroimaging:    CT BRAIN (01/18/2019): No acute intracranial abnormalities; Evolving right pontine infarc    CT ANGIOGRAM HEAD & NECK (01/18/2019): No hemodynamically significant stenosis, aneurysm, or vascular malformation; cervical degenerative spondylosis present       Assessment:  63 RHM with left neck pain and radiating paresthesias, concerning for cervical radiculopathy given distribution and imaging findings; unlikely to be carotid or vertebral artery dissection given normal vascular imaging findings.      Recommendations:    1. Start gabapentin 100mg tablets as follows:  (a) 1 tab PO QHS x 7 days, then  (b) 1 tab PO BID x 7 days, then  (c) 1 tab PO TID as goal dose    2. Patient is counseled to contact the [facility] neurolog[facility] clinic with any concerns for side effects of gabapentin, such as drowsiness or dizziness.    3. Patient is scheduled with follow-up with Dr. Doyle Sheth on February 12, 2019 in the [facility] neurology clinic. At that time, it will be determined whether the patient should be further evaluated with MRI Cervical Spine and/or EMG/NCS.      Please contact the Neurology consult service with any questions.    Mick Romo MD  Neurology Attending  [facility]

## 2019-01-18 NOTE — ED ADULT NURSE NOTE - CHPI ED NUR SYMPTOMS NEG
no vomiting/no change in level of consciousness/no confusion/no blurred vision/no loss of consciousness/no nausea/no weakness/no numbness/no fever/no dizziness

## 2019-01-18 NOTE — ED PROVIDER NOTE - PROGRESS NOTE DETAILS
spoke with Dr. Romo neuro, advised to get CT angio head/neck to r/o dissection CT angio head & neck- neg for dissection, advised by Dr. Romo for Gabapentin 100mg QD for 1 week, if tolerated, 100mg BID, Neuro outpt f/u

## 2019-01-18 NOTE — ED ADULT NURSE REASSESSMENT NOTE - NS ED NURSE REASSESS COMMENT FT1
received pt awake alert oriented x 3 not in dsitress,with family member at bedside,with saline lock intact no redness no swelling noted ,waiitng for dispo.

## 2019-01-18 NOTE — ED PROVIDER NOTE - OBJECTIVE STATEMENT
63 year old M Pt w/ PMHx of CVA (2 weeks ago), Raynaud Disease presents to ED c/o neck pain, left sided headache, weakness, chills x 5 days. Pt had a CVA 2 weeks ago, and is experiencing residual left sided weakness that he goes to physical therapy for. No hemorrhaging. Pt denies fever, nausea, vomiting, diarrhea, slurred speech and all other complaints. NKDA 63 year old M Pt w/ PMHx of CVA (2 weeks ago), Raynaud Disease presents to ED c/o neck pain, left sided headache, weakness, chills x 5 days. Pt claims his skin Is very sensitive to touch. Pt had a CVA 2 weeks ago, and is experiencing residual left sided weakness that he goes to physical therapy for. No hemorrhaging. Pt denies fever, nausea, vomiting, diarrhea, slurred speech, injury and all other complaints. NKDA

## 2019-01-18 NOTE — ED ADULT NURSE NOTE - OBJECTIVE STATEMENT
Pt states has left side headache x 4 days, was recently discharged , s/p TIA  main concern is that pt never has headaches this is all new

## 2019-01-18 NOTE — ED PROVIDER NOTE - PMH
Raynaud disease    TIA (transient ischemic attack) CVA (cerebral vascular accident)    Raynaud disease

## 2019-02-12 ENCOUNTER — APPOINTMENT (OUTPATIENT)
Dept: NEUROLOGY | Facility: CLINIC | Age: 64
End: 2019-02-12
Payer: COMMERCIAL

## 2019-02-12 VITALS
SYSTOLIC BLOOD PRESSURE: 153 MMHG | WEIGHT: 160 LBS | OXYGEN SATURATION: 98 % | TEMPERATURE: 98.4 F | HEIGHT: 68 IN | BODY MASS INDEX: 24.25 KG/M2 | HEART RATE: 71 BPM | DIASTOLIC BLOOD PRESSURE: 81 MMHG

## 2019-02-12 DIAGNOSIS — E78.00 PURE HYPERCHOLESTEROLEMIA, UNSPECIFIED: ICD-10-CM

## 2019-02-12 DIAGNOSIS — I63.81 OTHER CEREBRAL INFARCTION DUE TO OCCLUSION OR STENOSIS OF SMALL ARTERY: ICD-10-CM

## 2019-02-12 PROCEDURE — 99214 OFFICE O/P EST MOD 30 MIN: CPT

## 2019-02-12 RX ORDER — LISINOPRIL 5 MG/1
5 TABLET ORAL DAILY
Qty: 90 | Refills: 3 | Status: ACTIVE | COMMUNITY
Start: 2019-02-12 | End: 1900-01-01

## 2019-02-12 RX ORDER — ATORVASTATIN CALCIUM 20 MG/1
20 TABLET, FILM COATED ORAL
Qty: 3 | Refills: 0 | Status: ACTIVE | COMMUNITY
Start: 2019-02-12 | End: 1900-01-01

## 2019-02-12 NOTE — REVIEW OF SYSTEMS
[Sleep Disturbances] : sleep disturbances [Arm Weakness] : arm weakness [Hand Weakness] :  hand weakness [Leg Weakness] : leg weakness [Poor Coordination] : poor coordination [Difficulty Writing] : difficulty writing [Difficulties in Speech] : speech difficulties [Numbness] : numbness [Tingling] : tingling [Abnormal Sensation] : an abnormal sensation [Hypersensitivity] : hypersensitivity [Migraine Headache] : migraine headaches [Difficulty Walking] : difficulty walking [Ataxia] : ataxia [Limping] : limping [Eyesight Problems] : eyesight problems [Fever] : no fever [Chills] : no chills [Feeling Poorly] : not feeling poorly [Feeling Tired] : not feeling tired [Recent Weight Gain (___ Lbs)] : no recent weight gain [Recent Weight Loss (___ Lbs)] : no recent weight loss [Suicidal] : not suicidal [Anxiety] : no anxiety [Depression] : no depression [Change In Personality] : no personality change [Emotional Problems] : no emotional problems [Confused or Disoriented] : no confusion [Memory Lapses or Loss] : no memory loss [Decr. Concentrating Ability] : no decrease in concentrating ability [Difficulty with Language] : no ~M difficulty with language [Changed Thought Patterns] : no change in thought patterns [Repeating Questions] : no repeated questioning about recent events [Facial Weakness] : no facial weakness [Seizures] : no convulsions [Dizziness] : no dizziness [Fainting] : no fainting [Lightheadedness] : no lightheadedness [Vertigo] : no vertigo [Inability to Walk] : able to walk [Frequent Falls] : not falling [Eye Pain] : no eye pain [Red Eyes] : eyes not red [Discharge From Eyes] : no purulent discharge from the eyes [Dry Eyes] : no dryness of the eyes [Eyes Itch] : no itching of the eyes [Earache] : no earache [Loss Of Hearing] : no hearing loss [Nosebleeds] : no nosebleeds [Nasal Discharge] : no nasal discharge [Sore Throat] : no sore throat [Hoarseness] : no hoarseness [Heart Rate Is Slow] : the heart rate was not slow [Heart Rate Is Fast] : the heart rate was not fast [Chest Pain] : no chest pain [Palpitations] : no palpitations [Leg Claudication] : no intermittent leg claudication [Lower Ext Edema] : no extremity edema [Shortness Of Breath] : no shortness of breath [Arthralgias] : no arthralgias [Joint Pain] : no joint pain [Joint Swelling] : no joint swelling [Joint Stiffness] : no joint stiffness [Limb Pain] : no limb pain [Limb Swelling] : no limb swelling [Skin Lesions] : no skin lesions [Skin Wound] : no skin wound [Itching] : no itching [Change In A Mole] : no change in a mole [Dry Skin] : no dry skin [Proptosis] : no proptosis [Hot Flashes] : no hot flashes [Muscle Weakness] : no muscle weakness [Erectile Dysfunction] : no erectile dysfunction [Deepening Of The Voice] : no deepening of the voice [Feelings Of Weakness] : no feelings of weakness [Easy Bleeding] : no tendency for easy bleeding [Easy Bruising] : no tendency for easy bruising [Swollen Glands] : no swollen glands [Swollen Glands In The Neck] : no swollen glands in the neck

## 2019-02-12 NOTE — HISTORY OF PRESENT ILLNESS
[FreeTextEntry1] : Left side numbness when sleeping; tingling left head; drops objects clasped in his left side; squinting and difficulty reading. 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 He returned to the ER on Jan18 because of neck pain and left arm pain, numbness weakness. The CT angio of the head and neck was normal except for the evolving pontine stroke. He was discharged on gabapentin that he used until 1 week ago. He has minimal to mild pain in the neck and arm now.

## 2019-02-12 NOTE — DATA REVIEWED
[de-identified] : EXAM: MR ANGIO BRAIN \par EXAM: MR BRAIN \par PROCEDURE DATE: 01/07/2019 \par INTERPRETATION: . \par CLINICAL INFORMATION: Slurring of speech. \par TECHNIQUE: Multiplanar multi sequential MRI examination of the brain was \par performed without the administration of IV gadolinium. MRA images through \par the [place] were obtained using a combination of 2-D and 3-D \par time-of-flight acquisition. The data was then reformatted into a volumetric \par data set and reviewed as rotational MIP images. \par COMPARISON: There are no comparison MRIs. Comparison is made to a prior head \par CT examination from 1/5/2019. \par FINDINGS: \par MRI Brain: There is a wedge-shaped area of restricted diffusion within the \par right paramedian beatrice. There is associated T2/FLAIR prolongation compatible \par with cytotoxic edema. There is no acute intracranial hemorrhage. \par Otherwise, the brain parenchyma demonstrates normal morphology and signal. \par There is no hydrocephalus. The right lateral ventricle appears larger \par compared to the left which is a nonspecific finding. Flow-voids are noted \par throughout the major intracranial vessels, on the T2 weighted images, \par consistent with their patency. The sella turcica and posterior fossa are \par unremarkable. \par There is minimal scattered mucosal thickening throughout the ethmoid air \par cells. The mastoid air cells are clear. Calvarial signal is within normal \par limits. A right-sided ocular staphyloma is noted. The left orbit appears \par unremarkable. \par MRA [place]: The bilateral intracranial internal carotid, anterior, \par and middle cerebral arteries appear unremarkable. \par The anterior communicating artery is noted. The bilateral posterior \par communicating arteries are not well seen. \par The bilateral intradural vertebral arteries, vertebrobasilar junction, \par basilar artery, and basilar tip appear unremarkable as well as the bilateral \par posterior cerebral arteries. \par IMPRESSION: \par MRI brain: Acute infarction involving the right side of the beatrice with \par associated cytotoxic edema. No acute intracranial hemorrhage. \par MRA head: No large vessel occlusion or major stenosis. \par JOSÉ MANUEL ALARCON M.D., ATTENDING RADIOLOGIST \par This document has been electronically signed. Jan 7 2019 12:19PM  [de-identified] : EXAM: CT ANGIO NECK (W)AW IC \par EXAM: CT ANGIO BRAIN (W)AW IC \par PROCEDURE DATE: 01/18/2019 \par INTERPRETATION: \par PROCEDURE: CT ANGIO BRAIN (W)AW IC, CT ANGIO NECK (W)AW IC \par REASON FOR EXAM: Pontine infarct left-sided headaches and neck pain assess \par for dissection]. \par TECHNIQUE: Following administration of 95 ml of intravenous contrast \par material, CT angiography of the neck and head was performed with multiplanar \par reformations and maximum intensity (MIP) projection images of head and neck. \par COMPARISON: None. \par FINDINGS: \par There is patent aortic arch and the origins of the great vessels without \par hemodynamically significant stenosis. There is no significant stenosis at \par the origin of the right and left internal carotid artery without \par hemodynamically significant stenosis. There is normal visualized cervical \par segments of the internal carotid arteries bilaterally. There is normal \par opacification of the extracranial vertebral arteries. \par There is normal opacification of the bilateral petrous, cavernous and \par supraclinoid portions of the internal carotid arteries bilaterally with a \par patent supraclinoid bifurcation into anterior and middle cerebral arteries. \par There is normal caliber of the A1 and A2 segments of the anterior cerebral \par arteries bilaterally. \par There is a small intact anterior communicating artery (ACOM). \par There is opacification and caliber of the bilateral middle cerebral arteries \par and normal MCA bifurcation/trifurcation and visualized distal branches of \par the middle cerebral arteries. \par There are patent intradural vertebral and basilar arteries. There is normal \par basilar bifurcation. \par There is no demonstrated aneurysm of the [place] [facility] [place] or arteriovascular \par malformation (AVM) on CTA. There is no major vessel occlusion . \par There is cervical degenerative spondylosis. The visualized lung apices \par appear clear. \par IMPRESSION: \par No evidence of hemodynamically significant stenosis, aneurysm or AV \par malformation the [place]. \par AMRITA MÉNDEZ M.D., ATTENDING RADIOLOGIST

## 2019-02-12 NOTE — PHYSICAL EXAM
[Oriented To Time, Place, And Person] : oriented to person, place, and time [Impaired Insight] : insight and judgment were intact [Affect] : the affect was normal [Mood] : the mood was normal [Memory Recent] : recent memory was not impaired [Memory Remote] : remote memory was not impaired [Person] : oriented to person [Place] : oriented to place [Time] : oriented to time [Short Term Intact] : short term memory intact [Remote Intact] : remote memory intact [Registration Intact] : recent registration memory intact [Span Intact] : the attention span was normal [Concentration Intact] : normal concentrating ability [Visual Intact] : visual attention was ~T not ~L decreased [Naming Objects] : no difficulty naming common objects [Repeating Phrases] : no difficulty repeating a phrase [Writing A Sentence] : no difficulty writing a sentence [Fluency] : fluency intact [Comprehension] : comprehension intact [Reading] : reading intact [Current Events] : adequate knowledge of current events [Past History] : adequate knowledge of personal past history [Vocabulary] : adequate range of vocabulary [Cranial Nerves Optic (II)] : visual acuity intact bilaterally,  visual fields full to confrontation, pupils equal round and reactive to light [Cranial Nerves Oculomotor (III)] : extraocular motion intact [Cranial Nerves Trigeminal (V)] : facial sensation intact symmetrically [Cranial Nerves Facial (VII)] : face symmetrical [Cranial Nerves Vestibulocochlear (VIII)] : hearing was intact bilaterally [Cranial Nerves Glossopharyngeal (IX)] : tongue and palate midline [Cranial Nerves Accessory (XI - Cranial And Spinal)] : head turning and shoulder shrug symmetric [Cranial Nerves Hypoglossal (XII)] : there was no tongue deviation with protrusion [Cranial Nerves Facial Central - Left Only] : central 7th nerve weakness [Flattening Of Nasolabial Fold On The Left] : flattening of the  nasolabial fold [Cranial Nerves Left Facial: House Grade ___(1-6)] : grade II (slight, 80%) facial nerve function [Motor Tone] : muscle tone was normal in all four extremities [Motor Strength] : muscle strength was normal in all four extremities [Involuntary Movements] : no involuntary movements were seen [No Muscle Atrophy] : normal bulk in all four extremities [Paresis Pronator Drift Left-Sided] : a left-sided pronator drift was present [Motor Strength Upper Extremities Left] : there was weakness of the left upper extremity [Motor Strength Lower Extremities Left] : there was weakness of the left lower extremity [Sensation Tactile Decrease] : light touch was intact [Limited Balance] : the patient's balance was impaired [Dysdiadochokinesia On The Left] : present on the left side [Coordination - Dysmetria Impaired Finger-to-Nose Left Only] : present on the left side [3+] : Ankle jerk left 3+ [PERRL With Normal Accommodation] : pupils were equal in size, round, reactive to light, with normal accommodation [Optic Disc Abnormality] : the optic disc were normal in size and color [No APD] : no afferent pupillary defect [Neck Appearance] : the appearance of the neck was normal [Neck Cervical Mass (___cm)] : no neck mass was observed [Jugular Venous Distention Increased] : there was no jugular-venous distention [Thyroid Diffuse Enlargement] : the thyroid was not enlarged [Respiration, Rhythm And Depth] : normal respiratory rhythm and effort [Auscultation Breath Sounds / Voice Sounds] : lungs were clear to auscultation bilaterally [Apical Impulse] : the apical impulse was normal [Arterial Pulses Carotid] : carotid pulses were normal with no bruits [Abdominal Aorta] : the abdominal aorta was normal [Bowel Sounds] : normal bowel sounds [Abdomen Soft] : soft [Abdomen Tenderness] : non-tender [] : no hepato-splenomegaly [Abdomen Mass (___ Cm)] : no abdominal mass palpated [Nail Clubbing] : no clubbing  or cyanosis of the fingernails [Gait - Hemiparetic, Left Side] : hemiparetic on the left [Limping On The Left] : limping on the left [Motor Handedness Right-Handed] : the patient is right hand dominant [Hemiparesis Of Left Side] : hemiparesis was present on the left [Generalized Hypertonicity] : generalized hypertonicity was observed [Hemispasticity Of Left Side] : spasticity of the left side was present [Monospasticity Of Left Arm] : spasticity of the left arm was present [Monospasticity Of Left Leg] : spasticity of the left leg was present [Hypertonicity Lower Extremities Bilateral] : both legs were hypertonic [Cranial Nerves Facial Peripheral - Left Only] : no peripheral 7th nerve weakness [Mouth Droop On The Left] : no left-sided mouth droop [Palpebral Fissure Widened On The Left] : no widening of the palpebral fissure [Paresis Pronator Drift Right-Sided] : no pronator drift on the right [Motor Strength Upper Extremities Right] : strength was normal in the right upper extremity [Motor Strength Lower Extremities Right] : strength was normal in the right lower extremity [Past-pointing] : there was no past-pointing [Tremor] : no tremor present [Dysdiadochokinesia On The Right] : not present on the ride side [Coordination - Dysmetria Impaired Finger-to-Nose Right Only] : not present on the ride side [Coordination Dysmetria Impaired Heel-Shin Using Right Heel] : not present on the ride side [Coordination Dysmetria Impaired Heel-to-Shin Using Left Heel] : not present on the left side [Plantar Reflex Right Only] : normal on the right [Plantar Reflex Left Only] : normal on the left [___] : absent on the left [Apractic] : not apractic [Ataxic] : not ataxic [Ataxic, Wide-Based] : not wide-based and ataxic [Stooped] : not stooped [Shuffling] : not shuffling [Antalgic Gait Bilateral] : not antalgic  [Gait - Hemiparetic, Right Side] : not hemiparetic on the right [Limping On The Right] : not limping on the right [Gait - Steppage Right] : did not show steppage on the right  [Gait - Steppage Left] : did not show steppage on the left [Hypotonia Both Upper Extremities] : there was no flaccidity of the arms  [Paraflaccidity] : there was no flaccidity of the legs [Rigidity Bilateral] : there was no muscle rigidity noted [Cogwheel Rigidity Bilateral] : there was no cogwheel rigidity of the legs

## 2019-02-12 NOTE — DISCUSSION/SUMMARY
[FreeTextEntry1] : He doesn't have a family history, diabetes mellitus, history of smoking or tobacco use or recreational drug use of trauma. In the posterior has been tested for fasting cholesterol and found to be high. He is advided atorvastatin and follow up fasting cholestero level. Also he has been using nifedipine for treatment of Raynaud's disease. He is also using a small dose of prednisone (2.5 mg) and plaquenil for Raynaud's disease. It is likely that his blood pressure would be high I would not for nifedipine, however, an ACE inhibitor (or maybe even ARB) would also produce some degree of vasodilatation while providing better protection against recurrent stroke than nifedipine according to the progress trial\par \par He doesn't have a family history of strokes, history of smoking or tobacco use or recreational drug use. He himself does not suffer from diabetes mellitus, or suffered trauma. In the past, he has been tested for fasting cholesterol that has been noted to be abnormally high. He is advided atorvastatin and follow up fasting cholesterol level. Also he has been using nifedipine for treatment of Raynaud's disease. He is also using a small dose of prednisone (2.5 mg) and plaquenil for Raynaud's disease. It is likely that his blood pressure would be higher were it not for nifedipine; however, an ACE inhibitor (or maybe even an ARB) would also produce some degree of vasodilatation while providing better protection against recurrent stroke than nifedipine according to the progress trial\par Almost all of his residual symptoms concerning the sensory system including the afferents from muscles and tendons providing conscious perception of degree of muscle contraction, that, when disordered, prevents full perception of objects being clasped or held. He is advised to continue aspiring

## 2019-04-09 ENCOUNTER — APPOINTMENT (OUTPATIENT)
Dept: NEUROLOGY | Facility: CLINIC | Age: 64
End: 2019-04-09
Payer: COMMERCIAL

## 2019-04-09 VITALS
HEIGHT: 68 IN | HEART RATE: 79 BPM | WEIGHT: 161 LBS | OXYGEN SATURATION: 100 % | BODY MASS INDEX: 24.4 KG/M2 | SYSTOLIC BLOOD PRESSURE: 149 MMHG | DIASTOLIC BLOOD PRESSURE: 78 MMHG

## 2019-04-09 PROCEDURE — 99214 OFFICE O/P EST MOD 30 MIN: CPT

## 2019-04-09 NOTE — DATA REVIEWED
[de-identified] : EXAM: MR ANGIO BRAIN \par EXAM: MR BRAIN \par PROCEDURE DATE: 01/07/2019 \par INTERPRETATION: . \par CLINICAL INFORMATION: Slurring of speech. \par TECHNIQUE: Multiplanar multi sequential MRI examination of the brain was \par performed without the administration of IV gadolinium. MRA images through \par the [place] were obtained using a combination of 2-D and 3-D \par time-of-flight acquisition. The data was then reformatted into a volumetric \par data set and reviewed as rotational MIP images. \par COMPARISON: There are no comparison MRIs. Comparison is made to a prior head \par CT examination from 1/5/2019. \par FINDINGS: \par MRI Brain: There is a wedge-shaped area of restricted diffusion within the \par right paramedian beatrice. There is associated T2/FLAIR prolongation compatible \par with cytotoxic edema. There is no acute intracranial hemorrhage. \par Otherwise, the brain parenchyma demonstrates normal morphology and signal. \par There is no hydrocephalus. The right lateral ventricle appears larger \par compared to the left which is a nonspecific finding. Flow-voids are noted \par throughout the major intracranial vessels, on the T2 weighted images, \par consistent with their patency. The sella turcica and posterior fossa are \par unremarkable. \par There is minimal scattered mucosal thickening throughout the ethmoid air \par cells. The mastoid air cells are clear. Calvarial signal is within normal \par limits. A right-sided ocular staphyloma is noted. The left orbit appears \par unremarkable. \par MRA [place]: The bilateral intracranial internal carotid, anterior, \par and middle cerebral arteries appear unremarkable. \par The anterior communicating artery is noted. The bilateral posterior \par communicating arteries are not well seen. \par The bilateral intradural vertebral arteries, vertebrobasilar junction, \par basilar artery, and basilar tip appear unremarkable as well as the bilateral \par posterior cerebral arteries. \par IMPRESSION: \par MRI brain: Acute infarction involving the right side of the beatrice with \par associated cytotoxic edema. No acute intracranial hemorrhage. \par MRA head: No large vessel occlusion or major stenosis. \par JOSÉ MANUEL ALARCON M.D., ATTENDING RADIOLOGIST \par This document has been electronically signed. Jan 7 2019 12:19PM  [de-identified] : EXAM: CT ANGIO NECK (W)AW IC \par EXAM: CT ANGIO BRAIN (W)AW IC \par PROCEDURE DATE: 01/18/2019 \par INTERPRETATION: \par PROCEDURE: CT ANGIO BRAIN (W)AW IC, CT ANGIO NECK (W)AW IC \par REASON FOR EXAM: Pontine infarct left-sided headaches and neck pain assess \par for dissection]. \par TECHNIQUE: Following administration of 95 ml of intravenous contrast \par material, CT angiography of the neck and head was performed with multiplanar \par reformations and maximum intensity (MIP) projection images of head and neck. \par COMPARISON: None. \par FINDINGS: \par There is patent aortic arch and the origins of the great vessels without \par hemodynamically significant stenosis. There is no significant stenosis at \par the origin of the right and left internal carotid artery without \par hemodynamically significant stenosis. There is normal visualized cervical \par segments of the internal carotid arteries bilaterally. There is normal \par opacification of the extracranial vertebral arteries. \par There is normal opacification of the bilateral petrous, cavernous and \par supraclinoid portions of the internal carotid arteries bilaterally with a \par patent supraclinoid bifurcation into anterior and middle cerebral arteries. \par There is normal caliber of the A1 and A2 segments of the anterior cerebral \par arteries bilaterally. \par There is a small intact anterior communicating artery (ACOM). \par There is opacification and caliber of the bilateral middle cerebral arteries \par and normal MCA bifurcation/trifurcation and visualized distal branches of \par the middle cerebral arteries. \par There are patent intradural vertebral and basilar arteries. There is normal \par basilar bifurcation. \par There is no demonstrated aneurysm of the [place] or arteriovascular \par malformation (AVM) on CTA. There is no major vessel occlusion . \par There is cervical degenerative spondylosis. The visualized lung apices \par appear clear. \par IMPRESSION: \par No evidence of hemodynamically significant stenosis, aneurysm or AV \par malformation the [place]. \par AMRITA MÉNDEZ M.D., ATTENDING RADIOLOGIST

## 2019-04-09 NOTE — REVIEW OF SYSTEMS
[Sleep Disturbances] : sleep disturbances [Arm Weakness] : arm weakness [Leg Weakness] : leg weakness [Hand Weakness] :  hand weakness [Poor Coordination] : poor coordination [Difficulties in Speech] : speech difficulties [Difficulty Writing] : difficulty writing [Numbness] : numbness [Abnormal Sensation] : an abnormal sensation [Hypersensitivity] : hypersensitivity [Tingling] : tingling [Migraine Headache] : migraine headaches [Difficulty Walking] : difficulty walking [Ataxia] : ataxia [Limping] : limping [Eyesight Problems] : eyesight problems [Fever] : no fever [Chills] : no chills [Feeling Poorly] : not feeling poorly [Feeling Tired] : not feeling tired [Recent Weight Loss (___ Lbs)] : no recent weight loss [Recent Weight Gain (___ Lbs)] : no recent weight gain [Suicidal] : not suicidal [Anxiety] : no anxiety [Depression] : no depression [Change In Personality] : no personality change [Emotional Problems] : no emotional problems [Confused or Disoriented] : no confusion [Memory Lapses or Loss] : no memory loss [Decr. Concentrating Ability] : no decrease in concentrating ability [Difficulty with Language] : no ~M difficulty with language [Changed Thought Patterns] : no change in thought patterns [Repeating Questions] : no repeated questioning about recent events [Facial Weakness] : no facial weakness [Seizures] : no convulsions [Dizziness] : no dizziness [Fainting] : no fainting [Lightheadedness] : no lightheadedness [Vertigo] : no vertigo [Inability to Walk] : able to walk [Frequent Falls] : not falling [Eye Pain] : no eye pain [Red Eyes] : eyes not red [Discharge From Eyes] : no purulent discharge from the eyes [Dry Eyes] : no dryness of the eyes [Eyes Itch] : no itching of the eyes [Earache] : no earache [Loss Of Hearing] : no hearing loss [Nosebleeds] : no nosebleeds [Nasal Discharge] : no nasal discharge [Sore Throat] : no sore throat [Hoarseness] : no hoarseness [Heart Rate Is Slow] : the heart rate was not slow [Heart Rate Is Fast] : the heart rate was not fast [Chest Pain] : no chest pain [Palpitations] : no palpitations [Leg Claudication] : no intermittent leg claudication [Lower Ext Edema] : no extremity edema [Shortness Of Breath] : no shortness of breath [Joint Pain] : no joint pain [Arthralgias] : no arthralgias [Joint Swelling] : no joint swelling [Joint Stiffness] : no joint stiffness [Limb Pain] : no limb pain [Limb Swelling] : no limb swelling [Skin Lesions] : no skin lesions [Skin Wound] : no skin wound [Itching] : no itching [Change In A Mole] : no change in a mole [Dry Skin] : no dry skin [Proptosis] : no proptosis [Hot Flashes] : no hot flashes [Muscle Weakness] : no muscle weakness [Erectile Dysfunction] : no erectile dysfunction [Deepening Of The Voice] : no deepening of the voice [Feelings Of Weakness] : no feelings of weakness [Easy Bleeding] : no tendency for easy bleeding [Easy Bruising] : no tendency for easy bruising [Swollen Glands] : no swollen glands [Swollen Glands In The Neck] : no swollen glands in the neck

## 2019-04-09 NOTE — DISCUSSION/SUMMARY
[FreeTextEntry1] : \par He doesn't have a family history of strokes, history of smoking or tobacco use or recreational drug use. He himself does not suffer from diabetes mellitus, or suffered trauma. In the past, he has been tested for fasting cholesterol that has been noted to be abnormally high. He is advised atorvastatin and follow up fasting cholesterol level. Also he has been using nifedipine for treatment of Raynaud's disease. He is also using a small dose of prednisone (2.5 mg) and plaquenil for Raynaud's disease. It is likely that his blood pressure would be higher were it not for nifedipine; however, an ACE inhibitor (or maybe even an ARB) would also produce some degree of vasodilatation while providing better protection against recurrent stroke than nifedipine according to the progress trial\par Almost all of his residual symptoms concerning the sensory system including the afferents from muscles and tendons providing conscious perception of degree of muscle contraction, that, when disordered, prevents full perception of objects being clasped or held. He is advised to continue aspirin  \par \par He is significantly better and may resume work after a month

## 2019-04-09 NOTE — HISTORY OF PRESENT ILLNESS
[FreeTextEntry1] : Left side numbness when sleeping; tingling left head; drops objects clasped in his left side; squinting and difficulty reading. 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 He returned to the ER on Jan18 because of neck pain and left arm pain, numbness weakness. The CT angio of the head and neck was normal except for the evolving pontine stroke. He was discharged on gabapentin that he used until 1 week ago. He has minimal to mild pain in the neck and arm now.\par He reports he is well and ready to return to work soon [employer] [facility]

## 2019-04-09 NOTE — PHYSICAL EXAM
[Oriented To Time, Place, And Person] : oriented to person, place, and time [Impaired Insight] : insight and judgment were intact [Mood] : the mood was normal [Affect] : the affect was normal [Memory Remote] : remote memory was not impaired [Memory Recent] : recent memory was not impaired [Person] : oriented to person [Time] : oriented to time [Place] : oriented to place [Registration Intact] : recent registration memory intact [Remote Intact] : remote memory intact [Short Term Intact] : short term memory intact [Span Intact] : the attention span was normal [Visual Intact] : visual attention was ~T not ~L decreased [Concentration Intact] : normal concentrating ability [Repeating Phrases] : no difficulty repeating a phrase [Naming Objects] : no difficulty naming common objects [Fluency] : fluency intact [Comprehension] : comprehension intact [Writing A Sentence] : no difficulty writing a sentence [Reading] : reading intact [Current Events] : adequate knowledge of current events [Past History] : adequate knowledge of personal past history [Cranial Nerves Optic (II)] : visual acuity intact bilaterally,  visual fields full to confrontation, pupils equal round and reactive to light [Cranial Nerves Oculomotor (III)] : extraocular motion intact [Vocabulary] : adequate range of vocabulary [Cranial Nerves Trigeminal (V)] : facial sensation intact symmetrically [Cranial Nerves Vestibulocochlear (VIII)] : hearing was intact bilaterally [Cranial Nerves Facial (VII)] : face symmetrical [Cranial Nerves Hypoglossal (XII)] : there was no tongue deviation with protrusion [Cranial Nerves Accessory (XI - Cranial And Spinal)] : head turning and shoulder shrug symmetric [Cranial Nerves Glossopharyngeal (IX)] : tongue and palate midline [Cranial Nerves Facial Central - Left Only] : central 7th nerve weakness [Flattening Of Nasolabial Fold On The Left] : flattening of the  nasolabial fold [Motor Strength] : muscle strength was normal in all four extremities [Cranial Nerves Left Facial: House Grade ___(1-6)] : grade II (slight, 80%) facial nerve function [Motor Tone] : muscle tone was normal in all four extremities [Involuntary Movements] : no involuntary movements were seen [No Muscle Atrophy] : normal bulk in all four extremities [Sensation Tactile Decrease] : light touch was intact [Limited Balance] : the patient's balance was impaired [Coordination - Dysmetria Impaired Finger-to-Nose Left Only] : present on the left side [Dysdiadochokinesia On The Left] : present on the left side [No APD] : no afferent pupillary defect [Optic Disc Abnormality] : the optic disc were normal in size and color [PERRL With Normal Accommodation] : pupils were equal in size, round, reactive to light, with normal accommodation [Neck Appearance] : the appearance of the neck was normal [Neck Cervical Mass (___cm)] : no neck mass was observed [Jugular Venous Distention Increased] : there was no jugular-venous distention [Thyroid Diffuse Enlargement] : the thyroid was not enlarged [Auscultation Breath Sounds / Voice Sounds] : lungs were clear to auscultation bilaterally [Respiration, Rhythm And Depth] : normal respiratory rhythm and effort [Arterial Pulses Carotid] : carotid pulses were normal with no bruits [Apical Impulse] : the apical impulse was normal [Abdomen Soft] : soft [Abdominal Aorta] : the abdominal aorta was normal [Bowel Sounds] : normal bowel sounds [Abdomen Mass (___ Cm)] : no abdominal mass palpated [] : no hepato-splenomegaly [Abdomen Tenderness] : non-tender [Nail Clubbing] : no clubbing  or cyanosis of the fingernails [Gait - Hemiparetic, Left Side] : hemiparetic on the left [Limping On The Left] : limping on the left [Motor Handedness Right-Handed] : the patient is right hand dominant [Hemiparesis Of Left Side] : hemiparesis was present on the left [Generalized Hypertonicity] : generalized hypertonicity was observed [Hemispasticity Of Left Side] : spasticity of the left side was present [Monospasticity Of Left Arm] : spasticity of the left arm was present [Monospasticity Of Left Leg] : spasticity of the left leg was present [Hypertonicity Lower Extremities Bilateral] : both legs were hypertonic [3+] : Biceps left 3+ [Cranial Nerves Facial Peripheral - Left Only] : no peripheral 7th nerve weakness [Mouth Droop On The Left] : no left-sided mouth droop [Palpebral Fissure Widened On The Left] : no widening of the palpebral fissure [Paresis Pronator Drift Right-Sided] : no pronator drift on the right [Paresis Pronator Drift Left-Sided] : no pronator drift on the left [Motor Strength Upper Extremities Right] : strength was normal in the right upper extremity [Motor Strength Upper Extremities Left] : strength was normal in the left upper extremity [Motor Strength Lower Extremities Right] : strength was normal in the right lower extremity [Motor Strength Lower Extremities Left] : strength was normal in the left lower extremity [Past-pointing] : there was no past-pointing [Tremor] : no tremor present [Dysdiadochokinesia On The Right] : not present on the ride side [Coordination - Dysmetria Impaired Finger-to-Nose Right Only] : not present on the ride side [Coordination Dysmetria Impaired Heel-Shin Using Right Heel] : not present on the ride side [Coordination Dysmetria Impaired Heel-to-Shin Using Left Heel] : not present on the left side [Plantar Reflex Right Only] : normal on the right [Plantar Reflex Left Only] : normal on the left [___] : absent on the left [Apractic] : not apractic [Ataxic] : not ataxic [Ataxic, Wide-Based] : not wide-based and ataxic [Stooped] : not stooped [Shuffling] : not shuffling [Antalgic Gait Bilateral] : not antalgic  [Gait - Hemiparetic, Right Side] : not hemiparetic on the right [Limping On The Right] : not limping on the right [Gait - Steppage Right] : did not show steppage on the right  [Gait - Steppage Left] : did not show steppage on the left [Hypotonia Both Upper Extremities] : there was no flaccidity of the arms  [Paraflaccidity] : there was no flaccidity of the legs [Rigidity Bilateral] : there was no muscle rigidity noted [Cogwheel Rigidity Bilateral] : there was no cogwheel rigidity of the legs

## 2019-10-16 NOTE — PHYSICAL THERAPY INITIAL EVALUATION ADULT - PERTINENT HX OF CURRENT PROBLEM, REHAB EVAL
checked last filled 9-21-19 last OV 8-20-19 next OV 12-20-19   Pt. admitted for dizziness and slurring of speech. MRI revealed acute infarction involving the right side of the beatrice with associated cytotoxic edema. No acute intracranial hemorrhage.

## 2019-10-17 ENCOUNTER — TRANSCRIPTION ENCOUNTER (OUTPATIENT)
Age: 64
End: 2019-10-17

## 2019-10-18 ENCOUNTER — OUTPATIENT (OUTPATIENT)
Dept: OUTPATIENT SERVICES | Facility: HOSPITAL | Age: 64
LOS: 1 days | End: 2019-10-18
Payer: COMMERCIAL

## 2019-10-18 VITALS
WEIGHT: 162.26 LBS | TEMPERATURE: 98 F | HEIGHT: 68 IN | HEART RATE: 66 BPM | OXYGEN SATURATION: 99 % | DIASTOLIC BLOOD PRESSURE: 78 MMHG | RESPIRATION RATE: 13 BRPM | SYSTOLIC BLOOD PRESSURE: 128 MMHG

## 2019-10-18 VITALS
HEART RATE: 83 BPM | SYSTOLIC BLOOD PRESSURE: 144 MMHG | DIASTOLIC BLOOD PRESSURE: 85 MMHG | OXYGEN SATURATION: 100 % | RESPIRATION RATE: 19 BRPM

## 2019-10-18 DIAGNOSIS — H50.011 MONOCULAR ESOTROPIA, RIGHT EYE: ICD-10-CM

## 2019-10-18 DIAGNOSIS — Z98.890 OTHER SPECIFIED POSTPROCEDURAL STATES: Chronic | ICD-10-CM

## 2019-10-18 PROCEDURE — 67332 REREVISE EYE MUSCLES ADD-ON: CPT | Mod: RT

## 2019-10-18 PROCEDURE — 67311 REVISE EYE MUSCLE: CPT | Mod: RT

## 2019-10-18 NOTE — ASU DISCHARGE PLAN (ADULT/PEDIATRIC) - ASU DC SPECIAL INSTRUCTIONSFT
resume usual diet at home, as tolerated  no swimming for 2 weeks  clean bloody tears with moist cloth or paper towel as needed  resume spectacle use the day after surgery, if glasses are worn

## 2019-10-18 NOTE — ASU DISCHARGE PLAN (ADULT/PEDIATRIC) - CARE PROVIDER_API CALL
Dami Hdez)  Ophthalmology  [address], [address]  [place] [address]  Phone: [phone]  Fax: [phone]  Follow Up Time:

## 2019-10-18 NOTE — ASU PATIENT PROFILE, ADULT - PMH
CVA (cerebral vascular accident)  1/2019- no residuals  HLD (hyperlipidemia)    Hypertension    Raynaud disease <<-----Click on this checkbox to enter Past Medical History

## 2019-10-18 NOTE — ASU DISCHARGE PLAN (ADULT/PEDIATRIC) - CALL YOUR DOCTOR IF YOU HAVE ANY OF THE FOLLOWING:
Fever greater than (need to indicate Fahrenheit or Celsius)/Nausea and vomiting that does not stop/Swelling that gets worse/Bleeding that does not stop/Pain not relieved by Medications
